# Patient Record
Sex: MALE | Race: WHITE | Employment: UNEMPLOYED | URBAN - METROPOLITAN AREA
[De-identification: names, ages, dates, MRNs, and addresses within clinical notes are randomized per-mention and may not be internally consistent; named-entity substitution may affect disease eponyms.]

---

## 2018-01-23 ENCOUNTER — HOSPITAL ENCOUNTER (INPATIENT)
Age: 73
LOS: 2 days | Discharge: NURSING, REHAB OR LONG-TERM CARE | DRG: 694 | End: 2018-01-26
Attending: EMERGENCY MEDICINE | Admitting: HOSPITALIST
Payer: MEDICARE

## 2018-01-23 ENCOUNTER — APPOINTMENT (OUTPATIENT)
Dept: GENERAL RADIOLOGY | Age: 73
DRG: 694 | End: 2018-01-23
Attending: UROLOGY
Payer: MEDICARE

## 2018-01-23 ENCOUNTER — ANESTHESIA (OUTPATIENT)
Dept: SURGERY | Age: 73
DRG: 694 | End: 2018-01-23
Payer: MEDICARE

## 2018-01-23 ENCOUNTER — ANESTHESIA EVENT (OUTPATIENT)
Dept: SURGERY | Age: 73
DRG: 694 | End: 2018-01-23
Payer: MEDICARE

## 2018-01-23 DIAGNOSIS — N13.9 OBSTRUCTIVE UROPATHY: ICD-10-CM

## 2018-01-23 DIAGNOSIS — N20.1 URETEROLITHIASIS: Primary | ICD-10-CM

## 2018-01-23 PROBLEM — I10 HTN (HYPERTENSION): Status: ACTIVE | Noted: 2018-01-23

## 2018-01-23 PROBLEM — R01.1 CARDIAC MURMUR: Status: ACTIVE | Noted: 2018-01-23

## 2018-01-23 PROBLEM — F32.A DEPRESSION: Status: ACTIVE | Noted: 2018-01-23

## 2018-01-23 PROBLEM — N20.0 NEPHROLITHIASIS: Status: ACTIVE | Noted: 2018-01-23

## 2018-01-23 PROBLEM — F02.80 ALZHEIMER'S DEMENTIA (HCC): Status: ACTIVE | Noted: 2018-01-23

## 2018-01-23 PROBLEM — Z98.890 STATUS POST CYSTOSCOPY: Status: ACTIVE | Noted: 2018-01-23

## 2018-01-23 PROBLEM — G30.9 ALZHEIMER'S DEMENTIA (HCC): Status: ACTIVE | Noted: 2018-01-23

## 2018-01-23 PROBLEM — E11.9 TYPE II DIABETES MELLITUS (HCC): Status: ACTIVE | Noted: 2018-01-23

## 2018-01-23 PROBLEM — I25.2 HISTORY OF MYOCARDIAL INFARCTION: Status: ACTIVE | Noted: 2018-01-23

## 2018-01-23 LAB
ANION GAP SERPL CALC-SCNC: 4 MMOL/L (ref 3–18)
APPEARANCE UR: ABNORMAL
BACTERIA URNS QL MICRO: ABNORMAL /HPF
BASOPHILS # BLD: 0 K/UL (ref 0–0.06)
BASOPHILS NFR BLD: 0 % (ref 0–2)
BILIRUB UR QL: NEGATIVE
BUN SERPL-MCNC: 33 MG/DL (ref 7–18)
BUN/CREAT SERPL: 20 (ref 12–20)
CALCIUM SERPL-MCNC: 8.7 MG/DL (ref 8.5–10.1)
CHLORIDE SERPL-SCNC: 103 MMOL/L (ref 100–108)
CO2 SERPL-SCNC: 34 MMOL/L (ref 21–32)
COLOR UR: ABNORMAL
CREAT SERPL-MCNC: 1.68 MG/DL (ref 0.6–1.3)
DIFFERENTIAL METHOD BLD: ABNORMAL
EOSINOPHIL # BLD: 0 K/UL (ref 0–0.4)
EOSINOPHIL NFR BLD: 1 % (ref 0–5)
EPITH CASTS URNS QL MICRO: ABNORMAL /LPF (ref 0–5)
ERYTHROCYTE [DISTWIDTH] IN BLOOD BY AUTOMATED COUNT: 16.2 % (ref 11.6–14.5)
GLUCOSE BLD STRIP.AUTO-MCNC: 164 MG/DL (ref 70–110)
GLUCOSE BLD STRIP.AUTO-MCNC: 200 MG/DL (ref 70–110)
GLUCOSE BLD STRIP.AUTO-MCNC: 92 MG/DL (ref 70–110)
GLUCOSE SERPL-MCNC: 187 MG/DL (ref 74–99)
GLUCOSE UR STRIP.AUTO-MCNC: 500 MG/DL
HCT VFR BLD AUTO: 32.5 % (ref 36–48)
HGB BLD-MCNC: 10.1 G/DL (ref 13–16)
HGB UR QL STRIP: ABNORMAL
KETONES UR QL STRIP.AUTO: ABNORMAL MG/DL
LEUKOCYTE ESTERASE UR QL STRIP.AUTO: ABNORMAL
LYMPHOCYTES # BLD: 0.8 K/UL (ref 0.9–3.6)
LYMPHOCYTES NFR BLD: 11 % (ref 21–52)
MCH RBC QN AUTO: 26.9 PG (ref 24–34)
MCHC RBC AUTO-ENTMCNC: 31.1 G/DL (ref 31–37)
MCV RBC AUTO: 86.4 FL (ref 74–97)
MONOCYTES # BLD: 0.7 K/UL (ref 0.05–1.2)
MONOCYTES NFR BLD: 10 % (ref 3–10)
NEUTS SEG # BLD: 5.6 K/UL (ref 1.8–8)
NEUTS SEG NFR BLD: 78 % (ref 40–73)
NITRITE UR QL STRIP.AUTO: NEGATIVE
PH UR STRIP: 5 [PH] (ref 5–8)
PLATELET # BLD AUTO: 191 K/UL (ref 135–420)
PMV BLD AUTO: 10.5 FL (ref 9.2–11.8)
POTASSIUM SERPL-SCNC: 5.1 MMOL/L (ref 3.5–5.5)
PROT UR STRIP-MCNC: 300 MG/DL
RBC # BLD AUTO: 3.76 M/UL (ref 4.7–5.5)
RBC #/AREA URNS HPF: ABNORMAL /HPF (ref 0–5)
SODIUM SERPL-SCNC: 141 MMOL/L (ref 136–145)
SP GR UR REFRACTOMETRY: 1.03 (ref 1–1.03)
UROBILINOGEN UR QL STRIP.AUTO: 1 EU/DL (ref 0.2–1)
WBC # BLD AUTO: 7.1 K/UL (ref 4.6–13.2)
WBC URNS QL MICRO: ABNORMAL /HPF (ref 0–4)
YEAST URNS QL MICRO: ABNORMAL

## 2018-01-23 PROCEDURE — 85025 COMPLETE CBC W/AUTO DIFF WBC: CPT | Performed by: EMERGENCY MEDICINE

## 2018-01-23 PROCEDURE — 77030012510 HC MSK AIRWY LMA TELE -B: Performed by: ANESTHESIOLOGY

## 2018-01-23 PROCEDURE — 74011636637 HC RX REV CODE- 636/637: Performed by: NURSE PRACTITIONER

## 2018-01-23 PROCEDURE — 77030033263 HC DRSG MEPILEX 16-48IN BORD MOLN -B

## 2018-01-23 PROCEDURE — 76210000000 HC OR PH I REC 2 TO 2.5 HR: Performed by: UROLOGY

## 2018-01-23 PROCEDURE — 81001 URINALYSIS AUTO W/SCOPE: CPT | Performed by: EMERGENCY MEDICINE

## 2018-01-23 PROCEDURE — 74011000250 HC RX REV CODE- 250

## 2018-01-23 PROCEDURE — C2617 STENT, NON-COR, TEM W/O DEL: HCPCS | Performed by: UROLOGY

## 2018-01-23 PROCEDURE — BT1D1ZZ FLUOROSCOPY OF RIGHT KIDNEY, URETER AND BLADDER USING LOW OSMOLAR CONTRAST: ICD-10-PCS | Performed by: UROLOGY

## 2018-01-23 PROCEDURE — 74011250637 HC RX REV CODE- 250/637: Performed by: UROLOGY

## 2018-01-23 PROCEDURE — 74011000250 HC RX REV CODE- 250: Performed by: UROLOGY

## 2018-01-23 PROCEDURE — 80048 BASIC METABOLIC PNL TOTAL CA: CPT | Performed by: EMERGENCY MEDICINE

## 2018-01-23 PROCEDURE — 0T7D8ZZ DILATION OF URETHRA, VIA NATURAL OR ARTIFICIAL OPENING ENDOSCOPIC: ICD-10-PCS | Performed by: UROLOGY

## 2018-01-23 PROCEDURE — 74011250636 HC RX REV CODE- 250/636

## 2018-01-23 PROCEDURE — 74011636637 HC RX REV CODE- 636/637

## 2018-01-23 PROCEDURE — 87077 CULTURE AEROBIC IDENTIFY: CPT | Performed by: EMERGENCY MEDICINE

## 2018-01-23 PROCEDURE — 99218 HC RM OBSERVATION: CPT

## 2018-01-23 PROCEDURE — C1758 CATHETER, URETERAL: HCPCS | Performed by: UROLOGY

## 2018-01-23 PROCEDURE — 77030032490 HC SLV COMPR SCD KNE COVD -B: Performed by: UROLOGY

## 2018-01-23 PROCEDURE — 87086 URINE CULTURE/COLONY COUNT: CPT | Performed by: UROLOGY

## 2018-01-23 PROCEDURE — 93005 ELECTROCARDIOGRAM TRACING: CPT

## 2018-01-23 PROCEDURE — 76060000032 HC ANESTHESIA 0.5 TO 1 HR: Performed by: UROLOGY

## 2018-01-23 PROCEDURE — 74420 UROGRAPHY RTRGR +-KUB: CPT

## 2018-01-23 PROCEDURE — 74011250636 HC RX REV CODE- 250/636: Performed by: UROLOGY

## 2018-01-23 PROCEDURE — 87086 URINE CULTURE/COLONY COUNT: CPT | Performed by: EMERGENCY MEDICINE

## 2018-01-23 PROCEDURE — 74011250636 HC RX REV CODE- 250/636: Performed by: NURSE PRACTITIONER

## 2018-01-23 PROCEDURE — 87186 SC STD MICRODIL/AGAR DIL: CPT | Performed by: EMERGENCY MEDICINE

## 2018-01-23 PROCEDURE — 74011636637 HC RX REV CODE- 636/637: Performed by: ANESTHESIOLOGY

## 2018-01-23 PROCEDURE — 77030018836 HC SOL IRR NACL ICUM -A: Performed by: UROLOGY

## 2018-01-23 PROCEDURE — 74011250636 HC RX REV CODE- 250/636: Performed by: NURSE ANESTHETIST, CERTIFIED REGISTERED

## 2018-01-23 PROCEDURE — 0T768DZ DILATION OF RIGHT URETER WITH INTRALUMINAL DEVICE, VIA NATURAL OR ARTIFICIAL OPENING ENDOSCOPIC: ICD-10-PCS | Performed by: UROLOGY

## 2018-01-23 PROCEDURE — 77030034696 HC CATH URETH FOL 2W BARD -A: Performed by: UROLOGY

## 2018-01-23 PROCEDURE — 99284 EMERGENCY DEPT VISIT MOD MDM: CPT

## 2018-01-23 PROCEDURE — C1769 GUIDE WIRE: HCPCS | Performed by: UROLOGY

## 2018-01-23 PROCEDURE — 74011250637 HC RX REV CODE- 250/637: Performed by: NURSE PRACTITIONER

## 2018-01-23 PROCEDURE — 74011636320 HC RX REV CODE- 636/320: Performed by: UROLOGY

## 2018-01-23 PROCEDURE — 83036 HEMOGLOBIN GLYCOSYLATED A1C: CPT | Performed by: NURSE PRACTITIONER

## 2018-01-23 PROCEDURE — 82962 GLUCOSE BLOOD TEST: CPT

## 2018-01-23 PROCEDURE — 74011250637 HC RX REV CODE- 250/637: Performed by: ANESTHESIOLOGY

## 2018-01-23 PROCEDURE — 76010000138 HC OR TIME 0.5 TO 1 HR: Performed by: UROLOGY

## 2018-01-23 DEVICE — STENT URET 7FR L26CM POLYMER BLEND PH FREE COAT GRADUAL: Type: IMPLANTABLE DEVICE | Site: URETER | Status: FUNCTIONAL

## 2018-01-23 RX ORDER — MEMANTINE HYDROCHLORIDE 10 MG/1
TABLET ORAL 2 TIMES DAILY
COMMUNITY

## 2018-01-23 RX ORDER — DEXTROSE 50 % IN WATER (D50W) INTRAVENOUS SYRINGE
25-50 AS NEEDED
Status: DISCONTINUED | OUTPATIENT
Start: 2018-01-23 | End: 2018-01-23 | Stop reason: HOSPADM

## 2018-01-23 RX ORDER — DOCUSATE SODIUM 100 MG/1
100 CAPSULE, LIQUID FILLED ORAL 2 TIMES DAILY
Status: DISCONTINUED | OUTPATIENT
Start: 2018-01-23 | End: 2018-01-26 | Stop reason: HOSPADM

## 2018-01-23 RX ORDER — DONEPEZIL HYDROCHLORIDE 5 MG/1
10 TABLET, FILM COATED ORAL
Status: DISCONTINUED | OUTPATIENT
Start: 2018-01-23 | End: 2018-01-26 | Stop reason: HOSPADM

## 2018-01-23 RX ORDER — OXYBUTYNIN CHLORIDE 5 MG/1
5 TABLET ORAL 4 TIMES DAILY
Status: DISCONTINUED | OUTPATIENT
Start: 2018-01-23 | End: 2018-01-26 | Stop reason: HOSPADM

## 2018-01-23 RX ORDER — LIDOCAINE HYDROCHLORIDE 20 MG/ML
JELLY TOPICAL
Status: COMPLETED
Start: 2018-01-23 | End: 2018-01-23

## 2018-01-23 RX ORDER — DIPHENHYDRAMINE HCL 25 MG
25 CAPSULE ORAL
Status: DISCONTINUED | OUTPATIENT
Start: 2018-01-23 | End: 2018-01-26 | Stop reason: HOSPADM

## 2018-01-23 RX ORDER — DEXTROSE 50 % IN WATER (D50W) INTRAVENOUS SYRINGE
25-50 AS NEEDED
Status: DISCONTINUED | OUTPATIENT
Start: 2018-01-23 | End: 2018-01-26 | Stop reason: HOSPADM

## 2018-01-23 RX ORDER — MAGNESIUM SULFATE 100 %
4 CRYSTALS MISCELLANEOUS AS NEEDED
Status: DISCONTINUED | OUTPATIENT
Start: 2018-01-23 | End: 2018-01-26 | Stop reason: HOSPADM

## 2018-01-23 RX ORDER — INSULIN LISPRO 100 [IU]/ML
INJECTION, SOLUTION INTRAVENOUS; SUBCUTANEOUS ONCE
Status: COMPLETED | OUTPATIENT
Start: 2018-01-23 | End: 2018-01-23

## 2018-01-23 RX ORDER — FENTANYL CITRATE 50 UG/ML
25 INJECTION, SOLUTION INTRAMUSCULAR; INTRAVENOUS AS NEEDED
Status: DISCONTINUED | OUTPATIENT
Start: 2018-01-23 | End: 2018-01-23 | Stop reason: HOSPADM

## 2018-01-23 RX ORDER — LIDOCAINE HYDROCHLORIDE 20 MG/ML
JELLY TOPICAL ONCE
Status: DISCONTINUED | OUTPATIENT
Start: 2018-01-23 | End: 2018-01-23 | Stop reason: HOSPADM

## 2018-01-23 RX ORDER — SERTRALINE HYDROCHLORIDE 50 MG/1
50 TABLET, FILM COATED ORAL DAILY
Status: DISCONTINUED | OUTPATIENT
Start: 2018-01-24 | End: 2018-01-26 | Stop reason: HOSPADM

## 2018-01-23 RX ORDER — LIDOCAINE HYDROCHLORIDE 20 MG/ML
INJECTION, SOLUTION EPIDURAL; INFILTRATION; INTRACAUDAL; PERINEURAL AS NEEDED
Status: DISCONTINUED | OUTPATIENT
Start: 2018-01-23 | End: 2018-01-23 | Stop reason: HOSPADM

## 2018-01-23 RX ORDER — INSULIN LISPRO 100 [IU]/ML
INJECTION, SOLUTION INTRAVENOUS; SUBCUTANEOUS
Status: DISCONTINUED | OUTPATIENT
Start: 2018-01-23 | End: 2018-01-26 | Stop reason: HOSPADM

## 2018-01-23 RX ORDER — MAGNESIUM SULFATE 100 %
4 CRYSTALS MISCELLANEOUS AS NEEDED
Status: DISCONTINUED | OUTPATIENT
Start: 2018-01-23 | End: 2018-01-23 | Stop reason: HOSPADM

## 2018-01-23 RX ORDER — FENTANYL CITRATE 50 UG/ML
INJECTION, SOLUTION INTRAMUSCULAR; INTRAVENOUS AS NEEDED
Status: DISCONTINUED | OUTPATIENT
Start: 2018-01-23 | End: 2018-01-23 | Stop reason: HOSPADM

## 2018-01-23 RX ORDER — DONEPEZIL HYDROCHLORIDE 10 MG/1
10 TABLET, FILM COATED ORAL
COMMUNITY

## 2018-01-23 RX ORDER — ONDANSETRON 2 MG/ML
INJECTION INTRAMUSCULAR; INTRAVENOUS AS NEEDED
Status: DISCONTINUED | OUTPATIENT
Start: 2018-01-23 | End: 2018-01-23 | Stop reason: HOSPADM

## 2018-01-23 RX ORDER — SERTRALINE HYDROCHLORIDE 50 MG/1
75 TABLET, FILM COATED ORAL DAILY
COMMUNITY

## 2018-01-23 RX ORDER — HYDROCODONE BITARTRATE AND ACETAMINOPHEN 5; 325 MG/1; MG/1
1 TABLET ORAL
Status: DISCONTINUED | OUTPATIENT
Start: 2018-01-23 | End: 2018-01-26 | Stop reason: HOSPADM

## 2018-01-23 RX ORDER — ACETAMINOPHEN 325 MG/1
650 TABLET ORAL
Status: DISCONTINUED | OUTPATIENT
Start: 2018-01-23 | End: 2018-01-26 | Stop reason: HOSPADM

## 2018-01-23 RX ORDER — FUROSEMIDE 40 MG/1
TABLET ORAL DAILY
COMMUNITY

## 2018-01-23 RX ORDER — SODIUM CHLORIDE 0.9 % (FLUSH) 0.9 %
5-10 SYRINGE (ML) INJECTION AS NEEDED
Status: DISCONTINUED | OUTPATIENT
Start: 2018-01-23 | End: 2018-01-23 | Stop reason: HOSPADM

## 2018-01-23 RX ORDER — ONDANSETRON 2 MG/ML
4 INJECTION INTRAMUSCULAR; INTRAVENOUS ONCE
Status: DISCONTINUED | OUTPATIENT
Start: 2018-01-23 | End: 2018-01-23 | Stop reason: HOSPADM

## 2018-01-23 RX ORDER — INSULIN LISPRO 100 [IU]/ML
INJECTION, SOLUTION INTRAVENOUS; SUBCUTANEOUS
Status: COMPLETED
Start: 2018-01-23 | End: 2018-01-23

## 2018-01-23 RX ORDER — SODIUM CHLORIDE 0.9 % (FLUSH) 0.9 %
5-10 SYRINGE (ML) INJECTION EVERY 8 HOURS
Status: DISCONTINUED | OUTPATIENT
Start: 2018-01-23 | End: 2018-01-23 | Stop reason: HOSPADM

## 2018-01-23 RX ORDER — ONDANSETRON 2 MG/ML
4 INJECTION INTRAMUSCULAR; INTRAVENOUS
Status: DISCONTINUED | OUTPATIENT
Start: 2018-01-23 | End: 2018-01-26 | Stop reason: HOSPADM

## 2018-01-23 RX ORDER — INSULIN GLARGINE 100 [IU]/ML
35 INJECTION, SOLUTION SUBCUTANEOUS
COMMUNITY

## 2018-01-23 RX ORDER — LIDOCAINE HYDROCHLORIDE 10 MG/ML
0.1 INJECTION, SOLUTION EPIDURAL; INFILTRATION; INTRACAUDAL; PERINEURAL AS NEEDED
Status: DISCONTINUED | OUTPATIENT
Start: 2018-01-23 | End: 2018-01-23 | Stop reason: HOSPADM

## 2018-01-23 RX ORDER — ATROPA BELLADONNA AND OPIUM 16.2; 3 MG/1; MG/1
1 SUPPOSITORY RECTAL
Status: DISCONTINUED | OUTPATIENT
Start: 2018-01-23 | End: 2018-01-26 | Stop reason: HOSPADM

## 2018-01-23 RX ORDER — PROPOFOL 10 MG/ML
INJECTION, EMULSION INTRAVENOUS AS NEEDED
Status: DISCONTINUED | OUTPATIENT
Start: 2018-01-23 | End: 2018-01-23 | Stop reason: HOSPADM

## 2018-01-23 RX ORDER — EPHEDRINE SULFATE 50 MG/ML
INJECTION, SOLUTION INTRAVENOUS AS NEEDED
Status: DISCONTINUED | OUTPATIENT
Start: 2018-01-23 | End: 2018-01-23 | Stop reason: HOSPADM

## 2018-01-23 RX ORDER — FENTANYL CITRATE 50 UG/ML
50 INJECTION, SOLUTION INTRAMUSCULAR; INTRAVENOUS
Status: COMPLETED | OUTPATIENT
Start: 2018-01-23 | End: 2018-01-23

## 2018-01-23 RX ORDER — SODIUM CHLORIDE 9 MG/ML
25 INJECTION, SOLUTION INTRAVENOUS CONTINUOUS
Status: DISCONTINUED | OUTPATIENT
Start: 2018-01-23 | End: 2018-01-26 | Stop reason: HOSPADM

## 2018-01-23 RX ORDER — HYDROMORPHONE HYDROCHLORIDE 1 MG/ML
0.2 INJECTION, SOLUTION INTRAMUSCULAR; INTRAVENOUS; SUBCUTANEOUS
Status: DISCONTINUED | OUTPATIENT
Start: 2018-01-23 | End: 2018-01-26 | Stop reason: HOSPADM

## 2018-01-23 RX ORDER — MEMANTINE HYDROCHLORIDE 10 MG/1
10 TABLET ORAL 2 TIMES DAILY
Status: DISCONTINUED | OUTPATIENT
Start: 2018-01-23 | End: 2018-01-26 | Stop reason: HOSPADM

## 2018-01-23 RX ORDER — FAMOTIDINE 20 MG/1
20 TABLET, FILM COATED ORAL ONCE
Status: COMPLETED | OUTPATIENT
Start: 2018-01-23 | End: 2018-01-23

## 2018-01-23 RX ORDER — SODIUM CHLORIDE, SODIUM LACTATE, POTASSIUM CHLORIDE, CALCIUM CHLORIDE 600; 310; 30; 20 MG/100ML; MG/100ML; MG/100ML; MG/100ML
50 INJECTION, SOLUTION INTRAVENOUS CONTINUOUS
Status: DISCONTINUED | OUTPATIENT
Start: 2018-01-23 | End: 2018-01-23 | Stop reason: HOSPADM

## 2018-01-23 RX ADMIN — FENTANYL CITRATE 50 MCG: 50 INJECTION, SOLUTION INTRAMUSCULAR; INTRAVENOUS at 16:45

## 2018-01-23 RX ADMIN — SODIUM CHLORIDE 25 ML/HR: 900 INJECTION, SOLUTION INTRAVENOUS at 15:14

## 2018-01-23 RX ADMIN — INSULIN LISPRO 3 UNITS: 100 INJECTION, SOLUTION INTRAVENOUS; SUBCUTANEOUS at 17:15

## 2018-01-23 RX ADMIN — FENTANYL CITRATE 25 MCG: 50 INJECTION, SOLUTION INTRAMUSCULAR; INTRAVENOUS at 16:16

## 2018-01-23 RX ADMIN — DONEPEZIL HYDROCHLORIDE 10 MG: 5 TABLET, FILM COATED ORAL at 22:11

## 2018-01-23 RX ADMIN — PROPOFOL 120 MG: 10 INJECTION, EMULSION INTRAVENOUS at 15:54

## 2018-01-23 RX ADMIN — INSULIN LISPRO 6 UNITS: 100 INJECTION, SOLUTION INTRAVENOUS; SUBCUTANEOUS at 15:31

## 2018-01-23 RX ADMIN — HYDROCODONE BITARTRATE AND ACETAMINOPHEN 1 TABLET: 5; 325 TABLET ORAL at 18:00

## 2018-01-23 RX ADMIN — SODIUM CHLORIDE, SODIUM LACTATE, POTASSIUM CHLORIDE, AND CALCIUM CHLORIDE 50 ML/HR: 600; 310; 30; 20 INJECTION, SOLUTION INTRAVENOUS at 17:45

## 2018-01-23 RX ADMIN — HYDROCODONE BITARTRATE AND ACETAMINOPHEN 1 TABLET: 5; 325 TABLET ORAL at 22:10

## 2018-01-23 RX ADMIN — FAMOTIDINE 20 MG: 20 TABLET, FILM COATED ORAL at 15:34

## 2018-01-23 RX ADMIN — OXYBUTYNIN CHLORIDE 5 MG: 5 TABLET ORAL at 22:11

## 2018-01-23 RX ADMIN — INSULIN DETEMIR 25 UNITS: 100 INJECTION, SOLUTION SUBCUTANEOUS at 22:04

## 2018-01-23 RX ADMIN — EPHEDRINE SULFATE 10 MG: 50 INJECTION, SOLUTION INTRAVENOUS at 16:21

## 2018-01-23 RX ADMIN — FENTANYL CITRATE 50 MCG: 50 INJECTION, SOLUTION INTRAMUSCULAR; INTRAVENOUS at 17:00

## 2018-01-23 RX ADMIN — LIDOCAINE HYDROCHLORIDE 5 ML: 20 JELLY TOPICAL at 18:38

## 2018-01-23 RX ADMIN — FENTANYL CITRATE 50 MCG: 50 INJECTION, SOLUTION INTRAMUSCULAR; INTRAVENOUS at 15:58

## 2018-01-23 RX ADMIN — FENTANYL CITRATE 25 MCG: 50 INJECTION, SOLUTION INTRAMUSCULAR; INTRAVENOUS at 16:02

## 2018-01-23 RX ADMIN — LIDOCAINE HYDROCHLORIDE 60 MG: 20 INJECTION, SOLUTION EPIDURAL; INFILTRATION; INTRACAUDAL; PERINEURAL at 15:54

## 2018-01-23 RX ADMIN — HYDROMORPHONE HYDROCHLORIDE 0.2 MG: 1 INJECTION, SOLUTION INTRAMUSCULAR; INTRAVENOUS; SUBCUTANEOUS at 21:10

## 2018-01-23 RX ADMIN — OXYBUTYNIN CHLORIDE 5 MG: 5 TABLET ORAL at 18:39

## 2018-01-23 RX ADMIN — CEFTRIAXONE 1 G: 1 INJECTION, POWDER, FOR SOLUTION INTRAMUSCULAR; INTRAVENOUS at 16:00

## 2018-01-23 RX ADMIN — ONDANSETRON 4 MG: 2 INJECTION INTRAMUSCULAR; INTRAVENOUS at 16:06

## 2018-01-23 NOTE — ED PROVIDER NOTES
EMERGENCY DEPARTMENT HISTORY AND PHYSICAL EXAM    1:57 PM      Date: 1/23/2018  Patient Name: Tip Hung    History of Presenting Illness     Chief Complaint   Patient presents with    Flank Pain         History Provided By: Patient, medical records    Chief Complaint: Kidney stone    As the patient is without physical symptoms or complaints of pain, there is no severity of pain, quality of pain, duration, modifying factors, or associated signs and symptoms regarding the pt's presenting complaint. Additional History (Context): Tip Hung is a 71 y.o. male with hx DM who presents per EMS from [facility] for transfer. Pt was found to have a kidney stone requiring surgery and was transferred here. Upon ED arrival, pt is in no pain and asymptomatic. PCP: No primary care provider on file. Past History     Past Medical History:  Past Medical History:   Diagnosis Date    Diabetes ([address]     HTN (hypertension)        Past Surgical History:  No past surgical history on file. Family History:  No family history on file. Social History:  Social History   Substance Use Topics    Smoking status: Not on file    Smokeless tobacco: Not on file    Alcohol use Not on file       Allergies: Allergies not on file      Review of Systems     Review of Systems   Constitutional: Negative for diaphoresis and fever. HENT: Negative for congestion and sore throat. Eyes: Negative for pain and itching. Respiratory: Negative for cough and shortness of breath. Cardiovascular: Negative for chest pain and palpitations. Gastrointestinal: Negative for abdominal pain and diarrhea. Endocrine: Negative for polydipsia and polyuria. Genitourinary: Negative for dysuria and hematuria. Musculoskeletal: Negative for arthralgias and myalgias. Skin: Negative for rash and wound. Neurological: Negative for seizures and syncope. Hematological: Does not bruise/bleed easily.    Psychiatric/Behavioral: Negative for agitation and hallucinations. Physical Exam     Visit Vitals    /72    Pulse 92    Temp 97.5 °F (36.4 °C)    Resp 18    SpO2 96%       Physical Exam   Constitutional: He appears well-developed and well-nourished. No distress. HENT:   Head: Normocephalic and atraumatic. Eyes: Conjunctivae are normal. No scleral icterus. Neck: Normal range of motion. Neck supple. No JVD present. Cardiovascular: Normal rate, regular rhythm and normal heart sounds. 4 intact extremity pulses   Pulmonary/Chest: Effort normal and breath sounds normal.   Abdominal: Soft. He exhibits no mass. There is no tenderness. Musculoskeletal: Normal range of motion. Lymphadenopathy:     He has no cervical adenopathy. Neurological: He is alert. Skin: Skin is warm and dry. Nursing note and vitals reviewed. Diagnostic Study Results     Labs -  No results found for this or any previous visit (from the past 12 hour(s)). Radiologic Studies -   No orders to display     No results found. Medical Decision Making   Initial Medical Decision Making and DDx:    Creatinine 1.67, no acute distress on arrival to [facility]. No temp or elevated white count. Pt here with obstructing 7 mm right ureteral stone, plan for stenting. Clinical picture not c.w sepsis, diverticulitis, bowel obstruc or perf. ED Course: Progress Notes, Reevaluation, and Consults:    14:06 Consult:  Discussed care with Elizabeth Carson (Hospitalist). Standard discussion; including history of patients chief complaint, available diagnostic results, and treatment course. Will see and evaluate pt. Discussed obstructing kidney stone, plan for stent. Cardiac history cannot find prior echo in [facility]s record. 14:06 Consult:  Discussed care with Dr. Yolanda Brady  (Urology). Standard discussion; including history of patients chief complaint, available diagnostic results, and treatment course.  Discussed nml WBC, benign physical, pt is pleasant and cooperative. Dr. Fanny Shane will follow. I am the first provider for this patient. I reviewed the vital signs, available nursing notes, past medical history, past surgical history, family history and social history. Vital Signs-Reviewed the patient's vital signs. Pulse Oximetry Analysis - 96% on room air, non-hypoxic    Cardiac Monitor:  Rate/Rhythm:  92    Records Reviewed: Nursing Notes and Previous Radiology Studies (Time of Review: 1:53 PM)        For Hospitalized Patients:    2. Aspirin: Aspirin was not given because the patient did not present with a stroke at the time of their Emergency Department evaluation    Diagnosis     Clinical Impression:   1. Ureterolithiasis    2. Obstructive uropathy        Disposition: Admit      Patient's Medications    No medications on file     _______________________________    Attestations:  Scribe Attestation     Dixon Guzman acting as a scribe for and in the presence of Cyn Dunlap MD      January 23, 2018 at [facility] PM       Provider Attestation:      I personally performed the services described in the documentation, reviewed the documentation, as recorded by the scribe in my presence, and it accurately and completely records my words and actions.  January 23, 2018 at 2:18 PM - Cyn Dunlap MD    _______________________________

## 2018-01-23 NOTE — IP AVS SNAPSHOT
[address] [facility], [place], operated by [facility] 
 
 
 [address] Patient: Rajendra Jefferson MRN: [id number] About your hospitalization You were admitted on:  January 23, 2018 You last received care in the:  [address] [place] [address],[address] You were discharged on:  January 26, 2018 Why you were hospitalized Your primary diagnosis was:  Nephrolithiasis Your diagnoses also included:  Htn (Hypertension), Type Ii Diabetes Mellitus (Hcc), Cardiac Murmur, History Of Myocardial Infarction, Alzheimer's Dementia, Depression, Status Post Cystoscopy Follow-up Information Follow up With Details Comments Contact Info Felix [place]   [address] [address] [id number] [phone] [place] Philip MD In 1 week call for appt. [address] [place] [address] [address] [place] [address] [id number] [phone] Discharge Orders None A check liana indicates which time of day the medication should be taken. My Medications START taking these medications Instructions Each Dose to Equal  
 Morning Noon Evening Bedtime  
 oxybutynin 5 mg tablet Commonly known as:  BCIRPSLW Your last dose was: Your next dose is: Take 1 Tab by mouth four (4) times daily. Indications: post op 5 mg CONTINUE taking these medications Instructions Each Dose to Equal  
 Morning Noon Evening Bedtime  
 donepezil 10 mg tablet Commonly known as:  ARICEPT Your last dose was: Your next dose is: Take 10 mg by mouth nightly. 10 mg  
    
   
   
   
  
 LANTUS 100 unit/mL injection Generic drug:  insulin glargine Your last dose was: Your next dose is:    
   
   
 50 Units by SubCUTAneous route nightly. 50 Units LASIX 40 mg tablet Generic drug:  furosemide Your last dose was: Your next dose is: Take  by mouth daily. memantine 10 mg tablet Commonly known as:  Adilene Navarrete Your last dose was: Your next dose is: Take  by mouth two (2) times a day. ZOLOFT 50 mg tablet Generic drug:  sertraline Your last dose was: Your next dose is: Take  by mouth daily. Where to Get Your Medications Information on where to get these meds will be given to you by the nurse or doctor. ! Ask your nurse or doctor about these medications  
  oxybutynin 5 mg tablet Discharge Instructions DISCHARGE SUMMARY from Nurse PATIENT INSTRUCTIONS: 
 
 
F-face looks uneven A-arms unable to move or move unevenly S-speech slurred or non-existent T-time-call 911 as soon as signs and symptoms begin-DO NOT go Back to bed or wait to see if you get better-TIME IS BRAIN. Warning Signs of HEART ATTACK Call 911 if you have these symptoms: 
? Chest discomfort. Most heart attacks involve discomfort in the center of the chest that lasts more than a few minutes, or that goes away and comes back. It can feel like uncomfortable pressure, squeezing, fullness, or pain. ? Discomfort in other areas of the upper body. Symptoms can include pain or discomfort in one or both arms, the back, neck, jaw, or stomach. ? Shortness of breath with or without chest discomfort. ? Other signs may include breaking out in a cold sweat, nausea, or lightheadedness. Don't wait more than five minutes to call [address]! Fast action can save your life.  Calling 911 is almost always the fastest way to get lifesaving treatment. Emergency Medical Services staff can begin treatment when they arrive  up to an hour sooner than if someone gets to the hospital by car. The discharge information has been reviewed with the patient. The patient verbalized understanding. Discharge medications reviewed with the patient and appropriate educational materials and side effects teaching were provided. Patient armband removed and shredded. ___________________________________________________________________________________________________________________________________ Introducing [facility] & HEALTH SERVICES! [employer] introduces enGene patient portal. Now you can access parts of your medical record, email your doctor's office, and request medication refills online. 1. In your internet browser, go to [email or web address] 2. Click on the First Time User? Click Here link in the Sign In box. You will see the New Member Sign Up page. 3. Enter your enGene Access Code exactly as it appears below. You will not need to use this code after youve completed the sign-up process. If you do not sign up before the expiration date, you must request a new code. · enGene Access Code: [email or web address] Expires: 4/26/2018 11:48 AM 
 
4. Enter the last four digits of your Social Security Number (xxxx) and Date of Birth (mm/dd/yyyy) as indicated and click Submit. You will be taken to the next sign-up page. 5. Create a Greener Expressionst ID. This will be your Greener Expressionst login ID and cannot be changed, so think of one that is secure and easy to remember. 6. Create a Greener Expressionst password. You can change your password at any time. 7. Enter your Password Reset Question and Answer. This can be used at a later time if you forget your password. 8. Enter your e-mail address. You will receive e-mail notification when new information is available in [address] 9. Click Sign Up.  You can now view and download portions of your medical record. 10. Click the Download Summary menu link to download a portable copy of your medical information. If you have questions, please visit the Frequently Asked Questions section of the [employer] website. Remember, [employer] is NOT to be used for urgent needs. For medical emergencies, dial 911. Now available from your iPhone and Android! Unresulted Labs-Please follow up with your PCP about these lab tests Order Current Status XR RETROGRADE PYELOGRAM In process Providers Seen During Your Hospitalization Provider Specialty Primary office phone Naren Titus MD Emergency Medicine [phone] Ellyn Posey MD Urology [phone] Yoni Garland MD Internal Medicine [phone] Herlinda Sesay MD Internal Medicine [phone] Immunizations Administered for This Admission Name Date Influenza Vaccine (Quad) PF 1/24/2018 Your Primary Care Physician (PCP) Primary Care Physician Office Phone Office Fax [phone] [address] [phone] You are allergic to the following No active allergies Recent Documentation Smoking Status Former Smoker Emergency Contacts Name Discharge Info Relation Home Work Mobile Chantal Salmeron DISCHARGE CAREGIVER [3] Daughter [21]   [phone] Patient Belongings The following personal items are in your possession at time of discharge: 
  Dental Appliances: None  Visual Aid: None Discharge Instructions Attachments/References URETERAL STENT PLACEMENT: POST-OP (ENGLISH) LITHOTRIPSY: PRE-OP (ENGLISH) OXYBUTYNIN (BY MOUTH) (ENGLISH) Patient Handouts Ureteral Stent Placement: What to Expect at Home Your Recovery A ureteral (say \"you-REE-ter-ul\") stent is a thin, hollow tube that is placed in the ureter to help urine pass from the kidney into the bladder. Ureters are the tubes that connect the kidneys to the bladder. You may have a small amount of blood in your urine for 1 to 3 days after the procedure. While the stent is in place, you may have to urinate more often, feel a sudden need to urinate, or feel like you cannot completely empty your bladder. You may feel some pain when you urinate or do strenuous activity. You also may notice a small amount of blood in your urine after strenuous activities. These side effects usually do not prevent people from doing their normal daily activities. You may have a thin string coming out of your urethra. Your urethra is the tube that carries urine from your bladder to outside your body. This string is attached to the stent. Try not to pull on the string. The doctor will use the string to pull out the stent when you no longer need it. After the procedure, urine may flow better from your kidneys to your bladder. A ureteral stent may be left in place for several days or for as long as several months. Your doctor will take it out when you no longer need it. This care sheet gives you a general idea about how long it will take for you to recover. But each person recovers at a different pace. Follow the steps below to get better as quickly as possible. How can you care for yourself at home? Activity ? · Rest when you feel tired. Getting enough sleep will help you recover. ? · Avoid strenuous activities, such as bicycle riding, jogging, weight lifting, or aerobic exercise, until your doctor says it is okay. ? · Ask your doctor when you can drive again. ? · Most people are able to return to work the day after the procedure. If your work requires intense activity, you may feel pain in your kidney area or get tired easily. If this happens, you may need to do less strenuous activities while the stent is in. ? · Ask your doctor when it is okay for you to have sex. Diet ? · You can eat your normal diet. If your stomach is upset, try bland, low-fat foods like plain rice, broiled chicken, toast, and yogurt. ? · Drink plenty of fluids (unless your doctor tells you not to). Medicines ? · Your doctor will tell you if and when you can restart your medicines. He or she will also give you instructions about taking any new medicines. ? · If you take blood thinners, such as warfarin (Coumadin), clopidogrel (Plavix), or aspirin, be sure to talk to your doctor. He or she will tell you if and when to start taking those medicines again. Make sure that you understand exactly what your doctor wants you to do. ? · Be safe with medicines. Take pain medicines exactly as directed. ¨ If the doctor gave you a prescription medicine for pain, take it as prescribed. ¨ If you are not taking a prescription pain medicine, ask your doctor if you can take an over-the-counter medicine. ? · If you think your pain medicine is making you sick to your stomach: 
¨ Take your medicine after meals (unless your doctor has told you not to). ¨ Ask your doctor for a different pain medicine. ? · If your doctor prescribed antibiotics, take them as directed. Do not stop taking them just because you feel better. You need to take the full course of antibiotics. Follow-up care is a key part of your treatment and safety. Be sure to make and go to all appointments, and call your doctor if you are having problems. It's also a good idea to know your test results and keep a list of the medicines you take. When should you call for help? Call 911 anytime you think you may need emergency care. For example, call if: 
? · You passed out (lost consciousness). ? · You have severe trouble breathing. ? · You have sudden chest pain and shortness of breath, or you cough up blood. ? · You have severe belly pain. ?Call your doctor now or seek immediate medical care if: ? · Part or all of the stent comes out of your urethra. ? · You have pain that does not get better after you take pain medicine. ? · You have symptoms of a urinary infection. For example: ¨ You have blood or pus in your urine. ¨ You have pain in your back just below your rib cage. This is called flank pain. ¨ You have a fever, chills, or body aches. ¨ It hurts to urinate. ¨ You have groin or belly pain. ? · You cannot control when you urinate, or you leak urine. ? Watch closely for changes in your health, and be sure to contact your doctor if you have any problems. Where can you learn more? Go to http://hardy-guillermina.info/. Enter N127 in the search box to learn more about \"Ureteral Stent Placement: What to Expect at Home. \" Current as of: May 12, 2017 Content Version: 11.4 © 0364-5867 [employer]. Care instructions adapted under license by [employer] (which disclaims liability or warranty for this information). If you have questions about a medical condition or this instruction, always ask your healthcare professional. [place] [address] any warranty or liability for your use of this information. Lithotripsy: Before Your Procedure What is lithotripsy? Lithotripsy is a way to treat kidney stones without surgery. It is also called extracorporeal shock wave lithotripsy, or ESWL. This treatment uses sound waves to break kidney stones into tiny pieces. These pieces can then pass out of the body in the urine. At the clinic or doctor's office, you may get medicine to make you relaxed and help with pain or discomfort. The doctor will use an X-ray to find the stone. You will lie on a table, in most cases on top of a water-filled cushion. The lithotripsy machine directs sound waves at your stone through this cushion.  After the machine starts, you may hear a popping sound and feel a thump at your side. Do not move until you are told to do so. If you have any pain, tell the doctor. The doctor may use a small, flexible tube called a stent. The doctor puts the stent inside the ureter. This is one of the tubes that takes urine from your kidneys to your bladder. The stent will let the stone pass more easily. Your doctor may remove the stent in a few weeks. Most people are at the [address] [place] [address] office or clinic for about 2 hours. You can go back to your normal routine right away. Most stones pass within 24 hours after the procedure. But it can take as long as several weeks. If you have a large stone, you may need to come back for several treatments. In some cases lithotripsy does not break up the stones. Surgery may be needed to remove them. Follow-up care is a key part of your treatment and safety. Be sure to make and go to all appointments, and call your doctor if you are having problems. It's also a good idea to know your test results and keep a list of the medicines you take. What happens before the procedure? ?Preparing for the procedure ? · Understand exactly what procedure is planned, along with the risks, benefits, and other options. · Tell your doctors ALL the medicines, vitamins, supplements, and herbal remedies you take. Some of these can increase the risk of bleeding or interact with anesthesia. ? · If you take blood thinners, such as warfarin (Coumadin), clopidogrel (Plavix), or aspirin, be sure to talk to your doctor. He or she will tell you if you should stop taking these medicines before your procedure. Make sure that you understand exactly what your doctor wants you to do.  
? · Your doctor will tell you which medicines to take or stop before your procedure. You may need to stop taking certain medicines a week or more before the procedure. So talk to your doctor as soon as you can.  
? · If you have an advance directive, let your doctor know.  It may include a living will and a durable power of  for health care. Bring a copy to the hospital. If you don't have one, you may want to prepare one. It lets your doctor and loved ones know your health care wishes. Doctors advise that everyone prepare these papers before any type of surgery or procedure. Procedures can be stressful. This information will help you understand what you can expect. And it will help you safely prepare for your procedure. What happens on the day of the procedure? · Follow the instructions exactly about when to stop eating and drinking. If you don't, your procedure may be canceled. If your doctor told you to take your medicines on the day of the procedure, take them with only a sip of water. ? · Take a bath or shower before you come in for your procedure. Do not apply lotions, perfumes, deodorants, or nail polish. ? · Take off all jewelry and piercings. And take out contact lenses, if you wear them. ? At the hospital or surgery center · Bring a picture ID. ? · You will be kept comfortable and safe by your anesthesia provider. You may get medicine that relaxes you or puts you in a light sleep. The area being worked on will be numb. ? · The procedure will take about 1 hour. Going home · Be sure you have someone to drive you home. Anesthesia and pain medicine make it unsafe for you to drive. ? · You will be given more specific instructions about recovering from your procedure. They will cover things like diet, wound care, follow-up care, driving, and getting back to your normal routine. When should you call your doctor? · You have questions or concerns. ? · You don't understand how to prepare for your procedure. ? · You become ill before the procedure (such as fever, flu, or a cold). ? · You need to reschedule or have changed your mind about having the procedure. Where can you learn more? Go to http://hardy-guillermina.info/. Enter N872 in the search box to learn more about \"Lithotripsy: Before Your Procedure. \" Current as of: May 12, 2017 Content Version: 11.4 © 8110-2867 [employer]. Care instructions adapted under license by [employer] (which disclaims liability or warranty for this information). If you have questions about a medical condition or this instruction, always ask your healthcare professional. [address] any warranty or liability for your use of this information. Oxybutynin (By mouth) Oxybutynin (ps-w-JUX-ti-prosper) Treats overactive bladder. Brand Name(s): Ditropan XL There may be other brand names for this medicine. When This Medicine Should Not Be Used: This medicine is not right for everyone. Do not use it if you had an allergic reaction to oxybutynin. How to Use This Medicine:  
Liquid, Tablet, Long Acting Tablet · Take your medicine as directed. Your dose may need to be changed several times to find what works best for you. · Oral liquid: Measure the oral liquid medicine with a marked measuring spoon, oral syringe, or medicine cup. · Swallow the extended-release tablet whole. Do not crush, break, or chew it. Take this medicine at the same time each day. · If you take the extended-release tablet, part of the tablet may pass into your stools. This is normal and is nothing to worry about. · Missed dose: Take a dose as soon as you remember. If it is almost time for your next dose, wait until then and take a regular dose. Do not take extra medicine to make up for a missed dose. · Store the medicine in a closed container at room temperature, away from heat, moisture, and direct light. Do not freeze the oral liquid. Drugs and Foods to Avoid: Ask your doctor or pharmacist before using any other medicine, including over-the-counter medicines, vitamins, and herbal products. · Some foods and medicines can affect how oxybutynin works.  Tell your doctor if you are using any of the following: ¨ Metoclopramide ¨ Bisphosphonate medicine ¨ Medicine to treat an infection (including clarithromycin, erythromycin, itraconazole, ketoconazole, miconazole) · Tell your doctor if you use anything else that makes you sleepy. Some examples are allergy medicine, narcotic pain medicine, and alcohol. Warnings While Using This Medicine: · Tell your doctor if you are pregnant or breastfeeding, or if you have kidney disease, dementia, glaucoma, heart disease, heart rhythm problems, high blood pressure, myasthenia gravis, Parkinson disease, an enlarged prostate or trouble urinating, or stomach or bowel problems (including colitis, chronic constipation, a bowel blockage, GERD). · This medicine may make you dizzy or drowsy, or cause vision problems. Do not drive or do anything else that could be dangerous until you know how this medicine affects you. · This medicine may make you sweat less. This can cause your body to become too hot. Take precautions if you exercise strenuously or are outside in hot weather. Drink plenty of fluids to stay hydrated. · Your doctor will check your progress and the effects of this medicine at regular visits. Keep all appointments. · Keep all medicine out of the reach of children. Never share your medicine with anyone. Possible Side Effects While Using This Medicine:  
Call your doctor right away if you notice any of these side effects: · Allergic reaction: Itching or hives, swelling in your face or hands, swelling or tingling in your mouth or throat, chest tightness, trouble breathing · Agitation, confusion, unusual behavior or drowsiness, seeing, hearing, or feeling things that are not there · Change in how much or how often you urinate, difficult or painful urination · Hot, dry skin, lack of sweating, weakness If you notice these less serious side effects, talk with your doctor: · Constipation, diarrhea, nausea, heartburn, stomach pain or upset · Dry mouth If you notice other side effects that you think are caused by this medicine, tell your doctor. Call your doctor for medical advice about side effects. You may report side effects to FDA at 3-538-FDA-0756 © 2017 [address] Information is for End User's use only and may not be sold, redistributed or otherwise used for commercial purposes. The above information is an  only. It is not intended as medical advice for individual conditions or treatments. Talk to your doctor, nurse or pharmacist before following any medical regimen to see if it is safe and effective for you. Please provide this summary of care documentation to your next provider. Signatures-by signing, you are acknowledging that this After Visit Summary has been reviewed with you and you have received a copy. Patient Signature:  ____________________________________________________________ Date:  ____________________________________________________________  
  
Mary Salgado Provider Signature:  ____________________________________________________________ Date:  ____________________________________________________________

## 2018-01-23 NOTE — ANESTHESIA PREPROCEDURE EVALUATION
Anesthetic History   No history of anesthetic complications            Review of Systems / Medical History  Patient summary reviewed and pertinent labs reviewed    Pulmonary  Within defined limits                 Neuro/Psych         Psychiatric history     Cardiovascular    Hypertension: well controlled                   GI/Hepatic/Renal  Within defined limits              Endo/Other    Diabetes: poorly controlled, type 2    Obesity     Other Findings   Comments: Documentation of current medication  Current medications obtained, documented and obtained? YES      Risk Factors for Postoperative nausea/vomiting:       History of postoperative nausea/vomiting? NO       Female? NO       Motion sickness? NO       Intended opioid administration for postoperative analgesia? YES      Smoking Abstinence:  Current Smoker? NO  Elective Surgery? NO  Seen preoperatively by anesthesiologist or proxy prior to day of surgery? YES  Pt abstained from smoking 24 hours prior to anesthesia?  N/A    Preventive care/screening for High Blood Pressure:  Aged 18 years and older: YES  Screened for high blood pressure: YES  Patients with high blood pressure referred to primary care provider   for BP management: YES                 Physical Exam    Airway  Mallampati: II  TM Distance: 4 - 6 cm  Neck ROM: normal range of motion   Mouth opening: Normal     Cardiovascular  Regular rate and rhythm,  S1 and S2 normal,  no murmur, click, rub, or gallop             Dental  No notable dental hx       Pulmonary                 Abdominal  GI exam deferred       Other Findings            Anesthetic Plan    ASA: 3, emergent  Anesthesia type: general          Induction: Intravenous  Anesthetic plan and risks discussed with: Patient

## 2018-01-23 NOTE — CONSULTS
[address] [facility], [address] [facility]    [phone]                                           1/23/2018                   Urology Consultation      Assessment:     Neisha Sanchez is a 71 y.o. white male with 7 mm distal rt ureteral stone, DM and mild dementia       Plan:     - NPO  - Transferred from [facility] from NH  - Urine C&S  - Rocephin given pre op  - Cysto, rt retrograde, rt JJ    I discussed the evaluation, treatment and usual course of the diagnosis with the patient. The patient verbalizes understanding and agrees with the plan of care. All questions were answered. Thank you for allowing us to help in the care of this patient. Lisa Wilson MD    [phone] (pager)          Chief Complaint: kidney stone     Subjective:     Neisha Sanchez is a 71 y.o. white male who was admitted to the hospital on 1/23/2018 with a diagnosis of kidney stone. He is admitted by medicine. Additional medical issues addressed during this hospitalization include   Patient Active Problem List    Diagnosis Date Noted    Nephrolithiasis 01/23/2018    HTN (hypertension) 01/23/2018    Type II diabetes mellitus ([facility] [address] [address] 01/23/2018    Cardiac murmur 01/23/2018    History of myocardial infarction 01/23/2018    Alzheimer's dementia 01/23/2018    Depression 01/23/2018   . The patient now is referred for a urologic evaluation of rt 7 mm distal rt ureteral stone transferred from [facility]. He has a hx of DM and resides in a NH. He denies any nearby family. He does have mild dementia. His Cr was 1.67 and WBC 10.3. Glucose has been elevated. He previously had pain but this was controlled in the [facility]. CT showed mild hydro with a 7 mm distal stone. The patient's voiding pattern includes no frequency, urgency, dysuria, hematuria, nocturia or any incontience. He reports a good stream and feels like he empties completely. The pt has not been seen by a urologist previously.   He is unaware of prior stones. The patient has no history of  realted trauma/infections. No prior history of Kidney/Bladder/Prostate issues. No history of kidney stones. The patient does not currently report any fever, chills, nausea, vomiting, diarrhea, constipation, blurred vision, headache, difficulty swallowing, chest pain, SOB, abd pain, LE edema. Past Medical History:   Diagnosis Date    Diabetes ([address]     HTN (hypertension)        History reviewed. No pertinent surgical history. History reviewed. No pertinent family history. Social History     Social History    Marital status: SINGLE     Spouse name: N/A    Number of children: N/A    Years of education: N/A     Occupational History    Not on file.      Social History Main Topics    Smoking status: Former Smoker    Smokeless tobacco: Never Used    Alcohol use Yes      Comment: occasionally    Drug use: No    Sexual activity: Not on file     Other Topics Concern    Not on file     Social History Narrative    No narrative on file     Not on File  Current Facility-Administered Medications   Medication Dose Route Frequency Provider Last Rate Last Dose    0.9% sodium chloride infusion  25 mL/hr IntraVENous CONTINUOUS Celeste Romero NP 25 mL/hr at 01/23/18 1514 25 mL/hr at 01/23/18 1514    lidocaine (PF) (XYLOCAINE) 10 mg/mL (1 %) injection 0.1 mL  0.1 mL SubCUTAneous PRN Austyn Conner MD        sodium chloride (NS) flush 5-10 mL  5-10 mL IntraVENous Q8H Austyn Conner MD        sodium chloride (NS) flush 5-10 mL  5-10 mL IntraVENous PRN Austyn Conner MD        famotidine (PEPCID) tablet 20 mg  20 mg Oral ONCE Main Osorio MD        insulin lispro (HUMALOG) injection   SubCUTAneous ONCE Austyn Conner MD        glucose chewable tablet 16 g  4 Tab Oral PRN Austyn Conner MD        glucagon (GLUCAGEN) injection 1 mg  1 mg IntraMUSCular PRN Austyn Conner MD        dextrose (D50W) injection syrg 12.5-25 g  25-50 mL IntraVENous PRN Lauren Arnett MD           Review of symptoms  See HPI     Objective:       Physical Exam:    Visit Vitals    /74    Pulse 92    Temp 97.5 °F (36.4 °C)    Resp 18    SpO2 98%     No intake or output data in the 24 hours ending 01/23/18 1522      GEN: NAD, Nonlabored, A&O x 3 but unsure of why admitted  CHEST: Bilateral excursion  ABD: Obese, Soft/NT/ND, no peritoneal signs  BACK: No spinal tenderness, No CVAT Bilaterally  : 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 CT Abd/Pelvis Impression     Per report with a 7 mm distal rt stone and mild hydro    Imaging studies were reviewed and I agree with the official interpretation.

## 2018-01-23 NOTE — PERIOP NOTES
TRANSFER - OUT REPORT:    Verbal report given to TANYA Leary(name) on Ophelia Ponce  being transferred to 2200(unit) for routine post - op       Report consisted of patients Situation, Background, Assessment and   Recommendations(SBAR). Information from the following report(s) SBAR, Kardex, OR Summary, Intake/Output, MAR and Recent Results was reviewed with the receiving nurse. Lines:   Peripheral IV 01/23/18 Right Antecubital (Active)   Site Assessment Clean, dry, & intact 1/23/2018  4:30 PM   Phlebitis Assessment 0 1/23/2018  4:30 PM   Infiltration Assessment 0 1/23/2018  4:30 PM   Dressing Status Clean, dry, & intact 1/23/2018  4:30 PM   Dressing Type Transparent;Tape 1/23/2018  4:30 PM   Hub Color/Line Status Green;Flushed; Infusing 1/23/2018  4:30 PM        Opportunity for questions and clarification was provided.       Patient transported with:   Registered Nurse

## 2018-01-23 NOTE — PERIOP NOTES
1630  Patient received in PACU and connected to monitors. Vital signs stable. RN at bedside. Will continue to monitor. [address] to Dr. Giovanni Rothman via phone for ADT order. 1735  Pt tolerating sips of diet gingerale. Awaiting room assignment. Spoke to pt's daughter via phone in waiting area re update and plan of care. 1800  Updated pt's daughter with room assignment. Pt medicated per [facility] for c/o \"my pee-pee still hurts\". Talked with pt multiple times re surgical procedure, post op expectations, etc.    [address], NP, talking at length with pt at bedside re procedure details, what to expect post op, etc.    Earl Kohli  Paged Dr. Giovanni Rothman re pt continuing to c/o pain at catheter site.       1835  Xylocaine jelly applied by Margret Whitehead, GISELLA.

## 2018-01-23 NOTE — ANESTHESIA POSTPROCEDURE EVALUATION
Post-Anesthesia Evaluation and Assessment    Patient: Jerad Reynolds MRN: [id number]  SSN: [id number]    YOB: 1945  Age: 71 y.o. Sex: male      Data from PACU flowsheet    Cardiovascular Function/Vital Signs  Visit Vitals    /84    Pulse 82    Temp 36.1 °C (97 °F)    Resp 14    SpO2 99%       Patient is status post general anesthesia for Procedure(s):  CYSTOSCOPY URETERAL right  JJ STENT INSERTION ,retrogrades,uethral dilation. Nausea/Vomiting: controlled    Postoperative hydration reviewed and adequate. Pain:  Pain Scale 1: Visual (01/23/18 1715)  Pain Intensity 1: 0 (01/23/18 1715)   Managed      Mental Status and Level of Consciousness: Alert and oriented     Pulmonary Status:   O2 Device: Nasal cannula (01/23/18 1644)   Adequate oxygenation and airway patent    Complications related to anesthesia: None    Post-anesthesia assessment completed.  No concerns    Signed By: Elgie Meigs, CRNA     January 23, 2018

## 2018-01-23 NOTE — ED NOTES
Patient was transferred from [facility] for Urology consult due to a right side Kidney stone in CT scan. Per report Patient is in [facility] for rehab post fall. Patient was seen in [facility] ED for abdominal pain and CT scan showed right sided kidney stone.   Patient was given flomax, toradol and dilaudid at [facility].

## 2018-01-23 NOTE — PROGRESS NOTES
TRANSFER - IN REPORT:    Verbal report received from [facility] on Misha Canas  being received from PACU for routine post - op      Report consisted of patients Situation, Background, Assessment and   Recommendations(SBAR). Information from the following report(s) SBAR and OR Summary was reviewed with the receiving nurse. Opportunity for questions and clarification was provided. 1855 Received pt via bed awake and alert. Thin bloody drainage from penis around catheter. Urine dark feng in color. Wearing O2 via N/C at 2L Oriented to call bell, phone and IS with pt giving return demonstration.

## 2018-01-23 NOTE — PROCEDURES
Cystoscopy, Retrograde Pyelograms, Double J Stent Operative Note    Preoperative Diagnosis:  7 mm distal right ureteral stone, rt ureteral obstruction, UTI, buried penis    Postoperative Diagnosis: Same with meatal stenosis    Procedure:  Cystoscopy, urethral/meatal dilation,  right Retrograde Pyelograms, right Double J Stent    Surgeon(s):  Lian Bass MD   Assistant: None  Anesthesia:  General  EBL:  Minimal  Tubes and Drains:  7 Fr x 26 cm JJ Optima    18 Fr cararnza  Specimen(s):  Cx from rt renal pelvis and bladder    Complications:  none    Description of Procedure: The patient was identified and surgical site verification was performed prior to obtaining consent. The patient was brought to the cystoscopy suite. Under adequate general anesthesia the patient was positioned in dorsal lithotomy and prepped and draped. A preoperative Time Out was performed addressing the anticipated surgical site, procedure, and safety precautions. Rocephin given pre op. The 22Fr cystoscope was inserted into the patient's urethral meatus and advanced to the bladder. The anterior urethra had a significant meatal stenosis with a buried penis. The glans was palpated and meatus identified. Sounds were used to dilate the distal urethra to 30 Fr. The posterior urethra showed mild BPH. .    The bladder was inspected. The ureteral orifices were in their normal anatomic positions. The bladder showed mild trabeculation. Bladder tumors were not noted. With a ureteral catheter, contrast was injected in retrograde fashion up the ureter. Imaging prior to the procedure showed a 7 mm stone in the distal ureter. The ureter was dilated above this area. The calyces were dilated. Cultures were obtained above the level of obstruction by the open-ended ureteral catheter and from the bladder upon insertion of the scope. Next, a guidewire was passed through the Right ureteral orifice up the ureter.   Over the guidewire, a jj stent measuring  7 Fr  x  26 cm was positioned in the ureter by fluoro guidance and direct vision. A string was left attached to the stent and cut short left in the bladder. The patient's bladder was drained via the cystoscope. An 18 fr carranza was placed to monitor output and passively dilate the meatus The patient was awakened and transferred to the recovery room in stable condition. Plan: IV antibiotic per med   Await cxs   Maintain cath until OOB   KUB prior to d/c. Pt may be a candidate for Rt ESWL vs rt ureteroscopy and laser litho.     Manda Suggs MD

## 2018-01-23 NOTE — H&P
GENERAL GENERIC H&P/CONSULT    Ophelia Ponce is a 71 y.o. male with history of DM and HTN who [facility] a [facility]. Patient underwent CT there showing Right kidney stone. Patient was sent over at the request of Urology for stent placement. Patient denies any sob, cp, n/v, fever, chills, current abd pain or any other acute compliants at this time    Past Medical History:   Diagnosis Date    Diabetes ([address]     HTN (hypertension)       No past surgical history on file. Prior to Admission medications    Not on File     Allergies not on file   Social History   Substance Use Topics    Smoking status: Not on file    Smokeless tobacco: Not on file    Alcohol use Not on file      No family history on file. Review of Systems   Constitutional: Negative for activity change, fatigue and unexpected weight change. HENT: Negative for dental problem and facial swelling. Eyes: Negative. Respiratory: Negative. Cardiovascular: Negative. Gastrointestinal: Negative for abdominal distention. Endocrine: Negative. Genitourinary: Negative. Neurological: Negative. Hematological: Negative. Objective:          Patient Vitals for the past 8 hrs:   BP Temp Pulse Resp SpO2   01/23/18 1354 - - - - 96 %   01/23/18 1353 130/72 97.5 °F (36.4 °C) 92 18 (!) 88 %     Physical Exam   Constitutional: He is oriented to person, place, and time. No distress. HENT:   Head: Normocephalic. Eyes: Pupils are equal, round, and reactive to light. Neck: Normal range of motion. Cardiovascular: Normal rate. Murmur heard. Pulmonary/Chest: Effort normal and breath sounds normal. No stridor. Abdominal: Soft. Bowel sounds are normal. He exhibits no distension. There is no rebound and no guarding. Musculoskeletal: Normal range of motion. Neurological: He is oriented to person, place, and time.    drowsy   Skin: Skin is warm and dry. He is not diaphoretic. Assessment:  Principal Problem:    Nephrolithiasis (1/23/2018)    Active Problems:    HTN (hypertension) (1/23/2018)      Type II diabetes mellitus ([address] (1/23/2018)      Cardiac murmur (1/23/2018)      History of myocardial infarction (1/23/2018)        Plan:    Nephrolithiasis  -transfer from [facility] for CT can showing right Renal stone   -to undergo stent placement per Urology  -asymptomatics  -OR time per urology  -prn pain meds  -UA C&S sent  -EKG  -no leukcoytosis   -hold on abx  -gentle hydration  -non toxic    Murmur  -will check ECHO  -again poor historian and no previous records available     Hx of MI  -noted patient is poor historian unclear of acutual details however states MI 4 years ago  -will need to find actual records of medications.     Essential HTN  -stable    DM II  -corrective insulin  -basal dosing will cut in half for now    Alzheimeirs  -memnantine     Depression  -zoloft    DVT prophylaxis  -scd/teds    Signed:  Gurinder Steel NP 1/23/2018

## 2018-01-24 LAB
ALBUMIN SERPL-MCNC: 2.8 G/DL (ref 3.4–5)
ALBUMIN/GLOB SERPL: 0.8 {RATIO} (ref 0.8–1.7)
ALP SERPL-CCNC: 105 U/L (ref 45–117)
ALT SERPL-CCNC: 14 U/L (ref 16–61)
ANION GAP SERPL CALC-SCNC: 4 MMOL/L (ref 3–18)
AST SERPL-CCNC: 11 U/L (ref 15–37)
ATRIAL RATE: 82 BPM
BASOPHILS # BLD: 0 K/UL (ref 0–0.06)
BASOPHILS NFR BLD: 0 % (ref 0–2)
BILIRUB SERPL-MCNC: 0.4 MG/DL (ref 0.2–1)
BUN SERPL-MCNC: 35 MG/DL (ref 7–18)
BUN/CREAT SERPL: 20 (ref 12–20)
CALCIUM SERPL-MCNC: 8.8 MG/DL (ref 8.5–10.1)
CALCULATED P AXIS, ECG09: 22 DEGREES
CALCULATED R AXIS, ECG10: -38 DEGREES
CALCULATED T AXIS, ECG11: 63 DEGREES
CHLORIDE SERPL-SCNC: 105 MMOL/L (ref 100–108)
CO2 SERPL-SCNC: 33 MMOL/L (ref 21–32)
CREAT SERPL-MCNC: 1.77 MG/DL (ref 0.6–1.3)
DIAGNOSIS, 93000: NORMAL
DIFFERENTIAL METHOD BLD: ABNORMAL
EOSINOPHIL # BLD: 0.3 K/UL (ref 0–0.4)
EOSINOPHIL NFR BLD: 3 % (ref 0–5)
ERYTHROCYTE [DISTWIDTH] IN BLOOD BY AUTOMATED COUNT: 16.7 % (ref 11.6–14.5)
EST. AVERAGE GLUCOSE BLD GHB EST-MCNC: 232 MG/DL
GLOBULIN SER CALC-MCNC: 3.5 G/DL (ref 2–4)
GLUCOSE BLD STRIP.AUTO-MCNC: 122 MG/DL (ref 70–110)
GLUCOSE BLD STRIP.AUTO-MCNC: 148 MG/DL (ref 70–110)
GLUCOSE BLD STRIP.AUTO-MCNC: 163 MG/DL (ref 70–110)
GLUCOSE BLD STRIP.AUTO-MCNC: 223 MG/DL (ref 70–110)
GLUCOSE BLD STRIP.AUTO-MCNC: 57 MG/DL (ref 70–110)
GLUCOSE BLD STRIP.AUTO-MCNC: 58 MG/DL (ref 70–110)
GLUCOSE SERPL-MCNC: 54 MG/DL (ref 74–99)
HBA1C MFR BLD: 9.7 % (ref 4.2–5.6)
HCT VFR BLD AUTO: 34.5 % (ref 36–48)
HGB BLD-MCNC: 10.6 G/DL (ref 13–16)
LYMPHOCYTES # BLD: 1.5 K/UL (ref 0.9–3.6)
LYMPHOCYTES NFR BLD: 17 % (ref 21–52)
MCH RBC QN AUTO: 27.7 PG (ref 24–34)
MCHC RBC AUTO-ENTMCNC: 30.7 G/DL (ref 31–37)
MCV RBC AUTO: 90.1 FL (ref 74–97)
MONOCYTES # BLD: 0.8 K/UL (ref 0.05–1.2)
MONOCYTES NFR BLD: 9 % (ref 3–10)
NEUTS SEG # BLD: 6.1 K/UL (ref 1.8–8)
NEUTS SEG NFR BLD: 71 % (ref 40–73)
P-R INTERVAL, ECG05: 172 MS
PLATELET # BLD AUTO: 233 K/UL (ref 135–420)
PMV BLD AUTO: 11 FL (ref 9.2–11.8)
POTASSIUM SERPL-SCNC: 4.9 MMOL/L (ref 3.5–5.5)
PROT SERPL-MCNC: 6.3 G/DL (ref 6.4–8.2)
Q-T INTERVAL, ECG07: 388 MS
QRS DURATION, ECG06: 112 MS
QTC CALCULATION (BEZET), ECG08: 453 MS
RBC # BLD AUTO: 3.83 M/UL (ref 4.7–5.5)
SODIUM SERPL-SCNC: 142 MMOL/L (ref 136–145)
VENTRICULAR RATE, ECG03: 82 BPM
WBC # BLD AUTO: 8.6 K/UL (ref 4.6–13.2)

## 2018-01-24 PROCEDURE — 74011250637 HC RX REV CODE- 250/637: Performed by: NURSE PRACTITIONER

## 2018-01-24 PROCEDURE — 65270000029 HC RM PRIVATE

## 2018-01-24 PROCEDURE — 74011000250 HC RX REV CODE- 250: Performed by: UROLOGY

## 2018-01-24 PROCEDURE — 77030011256 HC DRSG MEPILEX <16IN NO BORD MOLN -A

## 2018-01-24 PROCEDURE — 90471 IMMUNIZATION ADMIN: CPT

## 2018-01-24 PROCEDURE — 74011250636 HC RX REV CODE- 250/636: Performed by: UROLOGY

## 2018-01-24 PROCEDURE — 90686 IIV4 VACC NO PRSV 0.5 ML IM: CPT | Performed by: UROLOGY

## 2018-01-24 PROCEDURE — 99218 HC RM OBSERVATION: CPT

## 2018-01-24 PROCEDURE — 74011636637 HC RX REV CODE- 636/637: Performed by: HOSPITALIST

## 2018-01-24 PROCEDURE — 36415 COLL VENOUS BLD VENIPUNCTURE: CPT | Performed by: NURSE PRACTITIONER

## 2018-01-24 PROCEDURE — 74011250637 HC RX REV CODE- 250/637: Performed by: UROLOGY

## 2018-01-24 PROCEDURE — 93306 TTE W/DOPPLER COMPLETE: CPT

## 2018-01-24 PROCEDURE — 74011636637 HC RX REV CODE- 636/637: Performed by: NURSE PRACTITIONER

## 2018-01-24 PROCEDURE — 80053 COMPREHEN METABOLIC PANEL: CPT | Performed by: NURSE PRACTITIONER

## 2018-01-24 PROCEDURE — 85025 COMPLETE CBC W/AUTO DIFF WBC: CPT | Performed by: NURSE PRACTITIONER

## 2018-01-24 PROCEDURE — 82962 GLUCOSE BLOOD TEST: CPT

## 2018-01-24 PROCEDURE — 74011250636 HC RX REV CODE- 250/636: Performed by: NURSE PRACTITIONER

## 2018-01-24 RX ADMIN — OXYBUTYNIN CHLORIDE 5 MG: 5 TABLET ORAL at 08:55

## 2018-01-24 RX ADMIN — HYDROCODONE BITARTRATE AND ACETAMINOPHEN 1 TABLET: 5; 325 TABLET ORAL at 13:59

## 2018-01-24 RX ADMIN — INSULIN LISPRO 4 UNITS: 100 INJECTION, SOLUTION INTRAVENOUS; SUBCUTANEOUS at 22:01

## 2018-01-24 RX ADMIN — OXYBUTYNIN CHLORIDE 5 MG: 5 TABLET ORAL at 13:46

## 2018-01-24 RX ADMIN — MEMANTINE HYDROCHLORIDE 10 MG: 10 TABLET ORAL at 08:55

## 2018-01-24 RX ADMIN — CEFTRIAXONE 1 G: 1 INJECTION, POWDER, FOR SOLUTION INTRAMUSCULAR; INTRAVENOUS at 17:58

## 2018-01-24 RX ADMIN — ATROPA BELLADONNA AND OPIUM 1 SUPPOSITORY: 16.2; 3 SUPPOSITORY RECTAL at 04:27

## 2018-01-24 RX ADMIN — INFLUENZA VIRUS VACCINE 0.5 ML: 15; 15; 15; 15 SUSPENSION INTRAMUSCULAR at 14:00

## 2018-01-24 RX ADMIN — INSULIN LISPRO 2 UNITS: 100 INJECTION, SOLUTION INTRAVENOUS; SUBCUTANEOUS at 17:29

## 2018-01-24 RX ADMIN — SERTRALINE HYDROCHLORIDE 50 MG: 50 TABLET ORAL at 08:55

## 2018-01-24 RX ADMIN — DONEPEZIL HYDROCHLORIDE 10 MG: 5 TABLET, FILM COATED ORAL at 22:00

## 2018-01-24 RX ADMIN — MEMANTINE HYDROCHLORIDE 10 MG: 10 TABLET ORAL at 17:29

## 2018-01-24 RX ADMIN — DOCUSATE SODIUM 100 MG: 100 CAPSULE, LIQUID FILLED ORAL at 17:29

## 2018-01-24 RX ADMIN — OXYBUTYNIN CHLORIDE 5 MG: 5 TABLET ORAL at 17:29

## 2018-01-24 RX ADMIN — DOCUSATE SODIUM 100 MG: 100 CAPSULE, LIQUID FILLED ORAL at 08:55

## 2018-01-24 RX ADMIN — SODIUM CHLORIDE 25 ML/HR: 900 INJECTION, SOLUTION INTRAVENOUS at 13:48

## 2018-01-24 RX ADMIN — HYDROMORPHONE HYDROCHLORIDE 0.2 MG: 1 INJECTION, SOLUTION INTRAMUSCULAR; INTRAVENOUS; SUBCUTANEOUS at 03:54

## 2018-01-24 RX ADMIN — OXYBUTYNIN CHLORIDE 5 MG: 5 TABLET ORAL at 22:00

## 2018-01-24 RX ADMIN — INSULIN DETEMIR 12 UNITS: 100 INJECTION, SOLUTION SUBCUTANEOUS at 22:02

## 2018-01-24 NOTE — DIABETES MGMT
Diabetes Patient/Family Education Record    Factors That  May Influence Patients Ability  to Learn or  Comply with Recommendations   []   Language barrier    []   Cultural needs   []   Motivation    []   Cognitive limitation    []   Physical   []   Education    []   Physiological factors   []   Hearing/vision/speaking impairment   []   Orthodox beliefs    []   Financial factors   [x]  Other: confused at times  Alzheimers/depression   []  No factors identified at this time. Person Instructed:   [x]   Patient   []   Family   []  Other     Preference for Learning:   [x]   Verbal   [x]   Written   []  Demonstration     Level of Comprehension & Competence:    []  Good                                      [x] Fair                                     []  Poor                             []  Needs Reinforcement   [x]  Teachback completed    Education Component:   [x]  Medication management, including how to administer insulin (if appropriate) and potential medication interactions States he takes Lantus 50 units every night. He does not miss doses. States he splits the dose and gives two 25 units. Reviewed correct pen technique with patient. Patient does not change his pen needle every day. He changes it after every 4th injection \"because those needles are expensive\". States he does leave the needle in his abdomen for a few seconds to ensure the entire dose was delivered. [x]  Nutritional management \"I don't eat three meals a day. \"  States if he eats a \"good\" breakfast, he won't eat again until dinner. Encouraged patient to have at least a midday snack. []  Exercise   [x]  Signs, symptoms, and treatment of hyperglycemia and hypoglycemia   [x] Prevention, recognition and treatment of hyperglycemia and hypoglycemia   [x]  Importance of blood glucose monitoring and how to obtain a blood glucose meter Has a glucometer and supplies at home.  States he only checks his blood glucose every so often.  Encouraged patient to check his BG at least every morning to see if it is in the target range of  mg/dl and twice a week after any meal to see if his BG is less than 180 mg/dl.   Patient states understanding.   []  Instruction on use of the blood glucose meter   [x]  Discuss the importance of HbA1C monitoring 9.7% equivalent to an average blood glucose of 232 mg/dl over the past 2-3 months   []  Sick day guidelines   [x]  Proper use and disposal of lancets, needles, syringes or insulin pens (if appropriate)   [x]  Potential long-term complications (retinopathy, kidney disease, neuropathy, foot care)   [x] Information about whom to contact in case of emergency or for more information    [x]  Goal:  Patient/family will demonstrate understanding of Diabetes Self Management Skills by: (date) __01/30/18_____  Plan for post-discharge education or self-management support:    [] Outpatient class schedule provided            [x] Patient Declined    [] Scheduled for outpatient classes (date) _______

## 2018-01-24 NOTE — PROGRESS NOTES
conducted an initial consultation and Spiritual Assessment for Sin Saunders, who is a 71 y.o.,male. Patients Primary Language is: [place]. According to the patients EMR Moravian Affiliation is: No Rastafari. The reason the Patient came to the hospital is:   Patient Active Problem List    Diagnosis Date Noted    Nephrolithiasis 01/23/2018    HTN (hypertension) 01/23/2018    Type II diabetes mellitus ([address] 01/23/2018    Cardiac murmur 01/23/2018    History of myocardial infarction 01/23/2018    Alzheimer's dementia 01/23/2018    Depression 01/23/2018    Status post cystoscopy 01/23/2018        The  provided the following Interventions:  Initiated a relationship of care and support with patient in room 2206. Listened empathically as he talked about being here and his hopes of a speedy recovery and release time. Provided information about Spiritual Care Services. Offered prayer and assurance of continued prayers on patients behalf. The following outcomes were achieved:  Patient shared limited information about his medical narrative and spiritual journey/beliefs. Patient processed feeling about current hospitalization. Patient expressed gratitude for pastoral care visit. Assessment:  Patient does not have any Sabianism/cultural needs that will affect patients preferences in health care. There are no further spiritual or Sabianism issues which require Spiritual Care Services interventions at this time. Plan:  Chaplains will continue to follow and will provide pastoral care on an as needed/requested basis    . Carolina Verduzco   Spiritual Care   [phone]

## 2018-01-24 NOTE — PROGRESS NOTES
I have placed the patient in [place] and matched him to Vida Brizuela in [facility]. I have obtained a \"received\" message only form Ramsey Montenegro in [place]. I called to speak to Ms Anurag Gipson and was told she would be out of the office until Friday. I left a message for the  when I asked to speak to someone from admission. I have placed a called and left a message for the patients daughter and asked that she return my call.  Nishant Araiza RN

## 2018-01-24 NOTE — PROGRESS NOTES
Patients daughter called and verified that the patient is in rehab at [facility] on the [address]. The daughter agrees to allow case management to arrange transportation with [facility] at discharge. I have explained it is uncertain if Medicare will cover the cost of the patients transportation, she stated she was confident that it would be covered because the patient  in a skilled facility.  Quinten Little RN

## 2018-01-24 NOTE — PHYSICIAN ADVISORY
Letter of Admission Status Determination: Upgrade to Inpatient     Brigida Franco was hospitalized as observation status on 1/23/2018. His hospital stay has already crossed one medically necessary midnight for management of symptomatic nephrolithiasis requiring stent placement, IV hydration, further evaluation, and monitoring. On day 2, ongoing hospitalization is warranted for this patient with hematuria, concern for infection, uncontrolled diabetes based on current plans including continued IV fluids, empiric IV antibiotics pending cultures, analgesics, further evaluation, and careful monitorin. Since Mr. Brigida Franco is expected to need at least two midnights of medically necessary hospital care, he will meet the benchmark for Inpatient Admission status in accordance with CMS regulation Section 43 .3. Based on the documented clinical condition and care plan, we recommend upgrading patient's hospitalization status from Observation to INPATIENT status. The final decision regarding the patient's hospitalization status depends on the attending physician's clinical judgment.        Hayde Paris MD, MARIA ESTHER, [place] [facility]  Physician [place].  [phone]    January 24, 2018   11:19 AM

## 2018-01-24 NOTE — WOUND CARE
Received IP WOUND CARE CONSULT per Dr. Jasmin Lugo, reviewed EMR, visited patient at bedside. Patient resting in bed, no distress noted. Assessed sacrum, and bilateral buttocks. Left buttocks has stage 2 pressure injury noted with partial scab, small amount of drainage noted, no odor. Blanchable erythema noted around wound opening. Patient stated \"I've had had it for years, is it cancer? \". Cleansed open wound with DWC and dried with 4 x 4 gauze. Covered with zinc ointment, placed blue pad underneath patient, and encouraged patient to change positions in bed frequently. Patient verbalized understanding. Reported findings and wound care to [facility]. PLEASE SEE WOUND CARE ORDERS:               01/24/18 1158   Wound Buttocks Posterior; Left   Date First Assessed/Time First Assessed: 01/23/18 2011   POA: Yes  Wound Type: Pressure injury  Location: Buttocks  Orientation: Posterior; Left   DRESSING STATUS Removed   DRESSING TYPE Foam   Pressure Injury Stage ll   Wound Length (cm) 5 cm   Wound Width (cm) 0.5 cm   Wound Depth (cm) 0.1   Wound Surface area (cm^3) 0.25 cm^2   Condition of Base Pink   Condition of Edges Rolled/curled; Open   Tissue Type Pink   Tissue Type Percent Pink 100   Drainage Amount  Scant   Drainage Color Serous   Wound Odor None   Periwound Skin Condition Erythema, blanchable   Cleansing and Cleansing Agents  Dermal wound cleanser   Dressing Type Applied Zinc based paste   Procedure Tolerated Well

## 2018-01-24 NOTE — PROGRESS NOTES
[employer]   Discharge Planning/ Assessment    Reasons for Intervention: Interviewed patient, he agrees to share his discharge information with his daughter, see below. He states his daughter work at [employer] on the [facility]. Demographic information verified. He states he is seen by Dr Syeda Chacon for his primary care needs. He was transported from [facility] to the ED department at [facility]. He states he is currently in rehab at [facility] on the [facility]. He states his home address is [facility] , he is unsure of the zip code. His discharge plan is to return to rehab at [facility]. Placed in e discharge and matched to [facility] on the [facility].      High Risk Criteria  [x] Yes  []No   Physician Referral  [] Yes  [x]No        Date    Nursing Referral  [] Yes  [x]No        Date    Patient/Family Request  [] Yes  [x]No        Date       Resources:    Medicare  [x] Yes  []No   Medicaid  [] Yes  [x]No   No Resources  [] Yes  [x]No   Private Insurance  [x] Yes  []No Blue cross    Name/Phone Number    Other  [] Yes  [x]No        (i.e. Workman's Comp)         Prior Services:    Prior Services  [x] Yes  []No   Home Health  [] Yes  [x]No   [address]  [] Yes  [x]No        Number of [address]  [] Yes  [x]No       Meals on Wheels  [] Yes  [x]No   Office on Aging  [] Yes  [x]No   Transportation Services  [] Yes  [x]No   Nursing Home  [x] Yes  []No        Nursing Home Name [address]  [] Yes  [x]No        [address] Name    Other       Information Source:      Information obtained from  [x] Patient  [] Parent   [] Katie Means  [] Child  [] Spouse   [] Significant Other/Partner   [] Friend      [] EMS    [] Nursing Home Chart          [x] Other: chart   Chart Review  [x] Yes  []No     Family/Support System:    Patient lives with  [] Alone    [] Spouse [] Significant Other  [] Children  [] Caretaker   [] Parent  [] Sibling     [] Other       Other Support System:    Is the patient responsible for care of others  [] Yes  [x]No   Information of person caring for patient on  discharge daughter   Managers financial affairs independently  [x] Yes  []No   If no, explain:      Status Prior to Admission:    Mental Status  [x] Awake  [x] Alert  [] Oriented  [] Quiet/Calm [] Lethargic/Sedated   [] Disoriented  [] Restless/Anxious  [] Combative   Personal Care  [] Dependent  [] [address]  [x] Requires Assistance   Meal Preparation Ability  [] Independent   [] Standby Assistance   [] Minimal Assistance   [x] Moderate Assistance  [] Maximum Assistance     [] Total Assistance   Chores  [] Independent with Chores   [] N/A Nursing Home Resident   [x] Requires Assistance   Bowel/Bladder  [] Continent  [] Catheter  [x] Incontinent  [] Ostomy Self-Care    [] Urine Diversion Self-Care  [] Maximum Assistance     [] Total Assistance   Number of Persons needed for assistance    DME at home  [] Myah Sanchez  [] Jay Sanchez   [] Commode    [] Bathroom/Grab Bars  [] Hospital Bed  [] Nebulizer  [] Oxygen           [] Raised Toilet Seat  [] Shower Chair  [] Side Rails for Bed   [] Tub Transfer Bench   [] Low Bell  [] Chari Garcia      [] Other:   Vendor      Treatment Presently Receiving:    Current Treatments  [] Chemotherapy  [] Dialysis  [] Insulin  [] IVAB [x] IVF   [] O2  [] PCA   [] PT   [] RT   [] Tube Feedings   [] Wound Care     Psychosocial Evaluation:    Verbalized Knowledge of Disease Process  [x] Patient  [x]Family   Coping with Disease Process  [x] Patient  [x]Family   Requires Further Counseling Coping with Disease Process  [] Patient  []Family     Identified Projected Needs:    Home Health Aid  [] Yes  [x]No   Transportation  [x] Yes  []No   Education  [] Yes  [x]No        Specific Education     Financial Counseling  [] Yes  [x]No   Inability to Care for Self/Will Require 24 hour care  [] Yes  [x]No   Pain Management  [] Yes  [x]No   Home Infusion Therapy  [] Yes  [x]No   Oxygen Therapy  [] Yes  [x]No   DME  [] Yes  [x]No   Long Term Care Placement  [] Yes  [x]No   Rehab  [x] Yes  []No   Physical Therapy  [x] Yes  []No   Needs Anticipated At This Time  [x] Yes  []No     Intra-Hospital Referral:    [facility]  [] Yes  [x]No     [] Yes  [x]No   Patient Representative  [] Yes  [x]No   Staff for Teaching Needs  [] Yes  [x]No   Specialty Teaching Needs     Diabetic Educator  [] Yes  [x]No   Referral for Diabetic Educator Needed  [] Yes  [x]No  If Yes, place order for Nutritionist or Diabetic Consult     Tentative Discharge Plan:    Home with No Services  [x] Yes  []No   Home with [address]  [] Yes  [x]No        If Yes, specify type    Home Care Program  [] Yes  [x]No        If Yes, specify type    Meals on Wheels  [] Yes  [x]No   Office of Aging  [] Yes  [x]No   NHP  [] Yes  [x]No   Return to the Nursing Home  [] Yes  [x]No   Rehab Therapy  [x] Yes  []No   Acute Rehab  [] Yes  [x]No   Subacute Rehab  [x] Yes  []No   Private Care  [] Yes  [x]No   Substance Abuse Referral  [] Yes  [x]No   Transportation  [] Yes  [x]No   Chore Service  [] Yes  [x]No   Inpatient Hospice  [] Yes  [x]No   OP RT  [] Yes  [x] No   OP Hemo  [] Yes  [x] No   OP PT  [] Yes  [x]No   Support Group  [] Yes  [x]No   Reach to Recovery  [] Yes  [x]No   OP Oncology Clinic  [] Yes  [x]No   Clinic Appointment  [] Yes  [x]No   DME  [] Yes  [x]No   Comments    Name of D/C Planner or  Given to Patient or Family Vicky Christopher RN   Phone Number [phone] extension [phone]   Date Jan 24, 2018   Time 701 am   If you are discharged home, whom do you designate to participate in your discharge plan and receive any information needed?      Enter name of Francois Buck  daughter        Phone # of alex         Address of alex Updated Jan 24, 2018        Patient refused to designate any           individual

## 2018-01-24 NOTE — PROGRESS NOTES
Urology Progress Note        Assessment/Plan:     Principal Problem:    Nephrolithiasis (2018)    Active Problems:    HTN (hypertension) (2018)      Type II diabetes mellitus ([address] (2018)      Cardiac murmur (2018)      History of myocardial infarction (2018)      Alzheimer's dementia (2018)      Depression (2018)      Status post cystoscopy (2018)    7 mm right distal ureteral stone    Status Post:  Procedure(s):  CYSTOSCOPY URETERAL right  JJ STENT INSERTION ,retrogrades,uethral dilation 18    Plan:    1. Cont Rocephin pending cx's  2. Cont carranza until urine clears and getting OOB. Will need to stop Ditropan prior  3. Definitive stone tx later    Subjective:     Daily Progress Note: 2018 9:18 AM    Vilma Martinez is 1 Day Post-Op and doing satisfactory. He reports pain is well controlled. He has no complaints. He is tolerating a solid diet and requiring help to get out of bed. Indwelling catheter is draining well. Objective:     Visit Vitals    /65 (BP 1 Location: Right arm, BP Patient Position: At rest)    Pulse 77    Temp 97.6 °F (36.4 °C)    Resp 18    SpO2 98%        Temp (24hrs), Av.8 °F (36.6 °C), Min:97 °F (36.1 °C), Max:98.8 °F (37.1 °C)      Intake and Output:  1901 -  0700  In: 090 [P.O.:350; I.V.:500]  Out: 385 [Urine:385]   07 -  190  In: -   Out: 200 [Urine:200]    Physical Exam:   General appearance: fatigued, cooperative, no distress, appears older than stated age  Abdomen: soft, non-tender.  . No masses,  no organomegaly    Carranza in place with red urine, no clots    Data Review:    Recent Results (from the past 24 hour(s))   CBC WITH AUTOMATED DIFF    Collection Time: 18  2:52 PM   Result Value Ref Range    WBC 7.1 4.6 - 13.2 K/uL    RBC 3.76 (L) 4.70 - 5.50 M/uL    HGB 10.1 (L) 13.0 - 16.0 g/dL    HCT 32.5 (L) 36.0 - 48.0 %    MCV 86.4 74.0 - 97.0 FL    MCH 26.9 24.0 - 34.0 PG    MCHC 31.1 31.0 - 37.0 g/dL    RDW 16.2 (H) 11.6 - 14.5 %    PLATELET 863 961 - 543 K/uL    MPV 10.5 9.2 - 11.8 FL    NEUTROPHILS 78 (H) 40 - 73 %    LYMPHOCYTES 11 (L) 21 - 52 %    MONOCYTES 10 3 - 10 %    EOSINOPHILS 1 0 - 5 %    BASOPHILS 0 0 - 2 %    ABS. NEUTROPHILS 5.6 1.8 - 8.0 K/UL    ABS. LYMPHOCYTES 0.8 (L) 0.9 - 3.6 K/UL    ABS. MONOCYTES 0.7 0.05 - 1.2 K/UL    ABS. EOSINOPHILS 0.0 0.0 - 0.4 K/UL    ABS.  BASOPHILS 0.0 0.0 - 0.06 K/UL    DF AUTOMATED     METABOLIC PANEL, BASIC    Collection Time: 01/23/18  2:52 PM   Result Value Ref Range    Sodium 141 136 - 145 mmol/L    Potassium 5.1 3.5 - 5.5 mmol/L    Chloride 103 100 - 108 mmol/L    CO2 34 (H) 21 - 32 mmol/L    Anion gap 4 3.0 - 18 mmol/L    Glucose 187 (H) 74 - 99 mg/dL    BUN 33 (H) 7.0 - 18 MG/DL    Creatinine 1.68 (H) 0.6 - 1.3 MG/DL    BUN/Creatinine ratio 20 12 - 20      GFR est AA 49 (L) >60 ml/min/1.73m2    GFR est non-AA 40 (L) >60 ml/min/1.73m2    Calcium 8.7 8.5 - 10.1 MG/DL   URINALYSIS W/ RFLX MICROSCOPIC    Collection Time: 01/23/18  2:52 PM   Result Value Ref Range    Color DARK YELLOW      Appearance CLOUDY      Specific gravity 1.026 1.005 - 1.030      pH (UA) 5.0 5.0 - 8.0      Protein 300 (A) NEG mg/dL    Glucose 500 (A) NEG mg/dL    Ketone TRACE (A) NEG mg/dL    Bilirubin NEGATIVE  NEG      Blood LARGE (A) NEG      Urobilinogen 1.0 0.2 - 1.0 EU/dL    Nitrites NEGATIVE  NEG      Leukocyte Esterase SMALL (A) NEG     URINE MICROSCOPIC ONLY    Collection Time: 01/23/18  2:52 PM   Result Value Ref Range    WBC 6 to 8 0 - 4 /hpf    RBC TOO NUMEROUS TO COUNT 0 - 5 /hpf    Epithelial cells FEW 0 - 5 /lpf    Bacteria 1+ (A) NEG /hpf    Yeast 1+ (A) NEG   HEMOGLOBIN A1C WITH EAG    Collection Time: 01/23/18  2:52 PM   Result Value Ref Range    Hemoglobin A1c 9.7 (H) 4.2 - 5.6 %    Est. average glucose 232 mg/dL   GLUCOSE, POC    Collection Time: 01/23/18  3:18 PM   Result Value Ref Range    Glucose (POC) 200 (H) 70 - 110 mg/dL   EKG, 12 LEAD, INITIAL Collection Time: 01/23/18  3:29 PM   Result Value Ref Range    Ventricular Rate 82 BPM    Atrial Rate 82 BPM    P-R Interval 172 ms    QRS Duration 112 ms    Q-T Interval 388 ms    QTC Calculation (Bezet) 453 ms    Calculated P Axis 22 degrees    Calculated R Axis -38 degrees    Calculated T Axis 63 degrees    Diagnosis       Sinus rhythm with premature atrial complexes with aberrant conduction  Left anterior fascicular block  Septal infarct , age undetermined Possible  Abnormal ECG  No previous ECGs available  Confirmed by Arnie White ([phone]) on 1/24/2018 8:50:23 AM     GLUCOSE, POC    Collection Time: 01/23/18  4:37 PM   Result Value Ref Range    Glucose (POC) 164 (H) 70 - 110 mg/dL   GLUCOSE, POC    Collection Time: 01/23/18  9:08 PM   Result Value Ref Range    Glucose (POC) 92 70 - 895 mg/dL   METABOLIC PANEL, COMPREHENSIVE    Collection Time: 01/24/18  5:30 AM   Result Value Ref Range    Sodium 142 136 - 145 mmol/L    Potassium 4.9 3.5 - 5.5 mmol/L    Chloride 105 100 - 108 mmol/L    CO2 33 (H) 21 - 32 mmol/L    Anion gap 4 3.0 - 18 mmol/L    Glucose 54 (LL) 74 - 99 mg/dL    BUN 35 (H) 7.0 - 18 MG/DL    Creatinine 1.77 (H) 0.6 - 1.3 MG/DL    BUN/Creatinine ratio 20 12 - 20      GFR est AA 46 (L) >60 ml/min/1.73m2    GFR est non-AA 38 (L) >60 ml/min/1.73m2    Calcium 8.8 8.5 - 10.1 MG/DL    Bilirubin, total 0.4 0.2 - 1.0 MG/DL    ALT (SGPT) 14 (L) 16 - 61 U/L    AST (SGOT) 11 (L) 15 - 37 U/L    Alk.  phosphatase 105 45 - 117 U/L    Protein, total 6.3 (L) 6.4 - 8.2 g/dL    Albumin 2.8 (L) 3.4 - 5.0 g/dL    Globulin 3.5 2.0 - 4.0 g/dL    A-G Ratio 0.8 0.8 - 1.7     CBC WITH AUTOMATED DIFF    Collection Time: 01/24/18  5:30 AM   Result Value Ref Range    WBC 8.6 4.6 - 13.2 K/uL    RBC 3.83 (L) 4.70 - 5.50 M/uL    HGB 10.6 (L) 13.0 - 16.0 g/dL    HCT 34.5 (L) 36.0 - 48.0 %    MCV 90.1 74.0 - 97.0 FL    MCH 27.7 24.0 - 34.0 PG    MCHC 30.7 (L) 31.0 - 37.0 g/dL    RDW 16.7 (H) 11.6 - 14.5 %    PLATELET 476 908 - 420 K/uL    MPV 11.0 9.2 - 11.8 FL    NEUTROPHILS 71 40 - 73 %    LYMPHOCYTES 17 (L) 21 - 52 %    MONOCYTES 9 3 - 10 %    EOSINOPHILS 3 0 - 5 %    BASOPHILS 0 0 - 2 %    ABS. NEUTROPHILS 6.1 1.8 - 8.0 K/UL    ABS. LYMPHOCYTES 1.5 0.9 - 3.6 K/UL    ABS. MONOCYTES 0.8 0.05 - 1.2 K/UL    ABS. EOSINOPHILS 0.3 0.0 - 0.4 K/UL    ABS.  BASOPHILS 0.0 0.0 - 0.06 K/UL    DF AUTOMATED     GLUCOSE, POC    Collection Time: 01/24/18  6:53 AM   Result Value Ref Range    Glucose (POC) 59 (L) 70 - 110 mg/dL   GLUCOSE, POC    Collection Time: 01/24/18  6:55 AM   Result Value Ref Range    Glucose (POC) 58 (L) 70 - 110 mg/dL   GLUCOSE, POC    Collection Time: 01/24/18  7:20 AM   Result Value Ref Range    Glucose (POC) 57 (L) 70 - 110 mg/dL   GLUCOSE, POC    Collection Time: 01/24/18  8:03 AM   Result Value Ref Range    Glucose (POC) 122 (H) 70 - 110 mg/dL     Cx's pending     Signed By:     Levar Todd MD   Urologic Oncologist  [facility]  Professor of Urology  [facility]  Office [phone] Ext [phone]                          January 24, 2018

## 2018-01-24 NOTE — PROGRESS NOTES
Progress Note      Patient: Silvana Jessica               Sex: male          DOA: 1/23/2018       YOB: 1945      Age:  71 y.o.        LOS:  LOS: 0 days               Subjective:   Pt is s/p cystoscopy and right  Retrograde pyelogram and right double J stent he has a 7 mm distal right  Ureteral stone  with right ureteral obsrtruction. The pt is demented with significant short term memory loss . He is otherwise comfortable and the kidney stone will be removed at at a later date . There are gram negative rods seen in the urine . Pt is on rocephin    Objective:      Visit Vitals    /65 (BP 1 Location: Right arm, BP Patient Position: At rest)    Pulse 77    Temp 97.6 °F (36.4 °C)    Resp 18    SpO2 98%       Physical Exam:  Pt is awake and is responsive . Heart rate and rhythm   Lungs good breath sounds herd   Abdomen soft and no pain on palpation   Neuro demented .        Lab/Data Reviewed:  BMP:   Lab Results   Component Value Date/Time     01/24/2018 05:30 AM    K 4.9 01/24/2018 05:30 AM     01/24/2018 05:30 AM    CO2 33 (H) 01/24/2018 05:30 AM    AGAP 4 01/24/2018 05:30 AM    GLU 54 (LL) 01/24/2018 05:30 AM    BUN 35 (H) 01/24/2018 05:30 AM    CREA 1.77 (H) 01/24/2018 05:30 AM    GFRAA 46 (L) 01/24/2018 05:30 AM    GFRNA 38 (L) 01/24/2018 05:30 AM     CMP:   Lab Results   Component Value Date/Time     01/24/2018 05:30 AM    K 4.9 01/24/2018 05:30 AM     01/24/2018 05:30 AM    CO2 33 (H) 01/24/2018 05:30 AM    AGAP 4 01/24/2018 05:30 AM    GLU 54 (LL) 01/24/2018 05:30 AM    BUN 35 (H) 01/24/2018 05:30 AM    CREA 1.77 (H) 01/24/2018 05:30 AM    GFRAA 46 (L) 01/24/2018 05:30 AM    GFRNA 38 (L) 01/24/2018 05:30 AM    CA 8.8 01/24/2018 05:30 AM    ALB 2.8 (L) 01/24/2018 05:30 AM    TP 6.3 (L) 01/24/2018 05:30 AM    GLOB 3.5 01/24/2018 05:30 AM    AGRAT 0.8 01/24/2018 05:30 AM    SGOT 11 (L) 01/24/2018 05:30 AM    ALT 14 (L) 01/24/2018 05:30 AM     CBC:   Lab Results Component Value Date/Time    WBC 8.6 01/24/2018 05:30 AM    HGB 10.6 (L) 01/24/2018 05:30 AM    HCT 34.5 (L) 01/24/2018 05:30 AM     01/24/2018 05:30 AM           Assessment/Plan     Principal Problem:    Nephrolithiasis (1/23/2018)    Active Problems:    HTN (hypertension) (1/23/2018)      Type II diabetes mellitus ([facility] (1/23/2018)      Cardiac murmur (1/23/2018)      History of myocardial infarction (1/23/2018)      Alzheimer's dementia (1/23/2018)      Depression (1/23/2018)      Status post cystoscopy (1/23/2018)  Decubitus  ulcer      Plan:pt will remain on rocephin for now until the results of the culture and sensitivities are known

## 2018-01-24 NOTE — PROGRESS NOTES
Patient has designated his daughter to participate in his/her discharge plan and to receive any needed information.      Name:   Nato Denney  daughter   [phone]      Jan 24, 2018        Address:  Phone number:

## 2018-01-24 NOTE — PROGRESS NOTES
2015 Received patient from Christiano Perezows. Patient is alert to person time situation not place. 2033 Skin inspection revealed 0.25 cm open area on sacrum, Skin consult placed, barrier cream in place, Optifoam placed for prevention. 0000 patient resting asleep.  0400 Patient awoke in a panic, this patient is displaying extremely short memory span. 0630 Patient had a blood sugar of 58 when checked on the other hand and a different finger Blood sugar was 57. This nurse began blood sugar protocol 4 glucose tablets and 1 large apple juice were given. Recheck revealed (after 15 minutes as per protocol) 55 Requested MD to be paged, and at this point went for D50 before it could be administered Breakfast was served and patient was eating when entering the room, Immediately rechecked sugar of 122.  0711 Bedside and Verbal shift change report given to Hillary Carrillo RN (oncoming nurse) by Joe Lewis RN (offgoing nurse). Report included the following information SBAR, Kardex, Procedure Summary, Intake/Output, MAR and Recent Results.

## 2018-01-24 NOTE — PROGRESS NOTES
1314 Bedside and Verbal shift change report given to Dulce Wesley RN   (oncoming nurse) by Lucy Sommers (offgoing nurse). Report included the following information SBAR, Kardex and MAR.     1314 assessment complete, patient in bed, call bell within reach, no acute distress noted     1630 MD Erin Gross made aware patient's daughter Manuel Randhawa) request work excuse note for 23rd and 24th and an additional for grandsharon Kuhn, informed will come later today to fill out note. Patient's daughter made aware. 1916 Bedside and Verbal shift change report given to Augustine Garcia (oncoming nurse) by Dulce Wesley RN   (offgoing nurse). Report included the following information SBAR, Kardex and MAR .

## 2018-01-25 LAB
BACTERIA SPEC CULT: ABNORMAL
BACTERIA SPEC CULT: NORMAL
BACTERIA SPEC CULT: NORMAL
GLUCOSE BLD STRIP.AUTO-MCNC: 104 MG/DL (ref 70–110)
GLUCOSE BLD STRIP.AUTO-MCNC: 109 MG/DL (ref 70–110)
GLUCOSE BLD STRIP.AUTO-MCNC: 124 MG/DL (ref 70–110)
GLUCOSE BLD STRIP.AUTO-MCNC: 147 MG/DL (ref 70–110)
GLUCOSE BLD STRIP.AUTO-MCNC: 59 MG/DL (ref 70–110)
SERVICE CMNT-IMP: ABNORMAL
SERVICE CMNT-IMP: NORMAL
SERVICE CMNT-IMP: NORMAL

## 2018-01-25 PROCEDURE — 74011250636 HC RX REV CODE- 250/636: Performed by: NURSE PRACTITIONER

## 2018-01-25 PROCEDURE — 82962 GLUCOSE BLOOD TEST: CPT

## 2018-01-25 PROCEDURE — 74011636637 HC RX REV CODE- 636/637: Performed by: HOSPITALIST

## 2018-01-25 PROCEDURE — 77030011256 HC DRSG MEPILEX <16IN NO BORD MOLN -A

## 2018-01-25 PROCEDURE — 74011000250 HC RX REV CODE- 250: Performed by: UROLOGY

## 2018-01-25 PROCEDURE — 74011250637 HC RX REV CODE- 250/637: Performed by: UROLOGY

## 2018-01-25 PROCEDURE — 74011250636 HC RX REV CODE- 250/636: Performed by: UROLOGY

## 2018-01-25 PROCEDURE — 65270000029 HC RM PRIVATE

## 2018-01-25 PROCEDURE — 74011250637 HC RX REV CODE- 250/637: Performed by: NURSE PRACTITIONER

## 2018-01-25 RX ADMIN — MEMANTINE HYDROCHLORIDE 10 MG: 10 TABLET ORAL at 11:40

## 2018-01-25 RX ADMIN — CEFTRIAXONE 1 G: 1 INJECTION, POWDER, FOR SOLUTION INTRAMUSCULAR; INTRAVENOUS at 16:40

## 2018-01-25 RX ADMIN — OXYBUTYNIN CHLORIDE 5 MG: 5 TABLET ORAL at 21:18

## 2018-01-25 RX ADMIN — OXYBUTYNIN CHLORIDE 5 MG: 5 TABLET ORAL at 11:40

## 2018-01-25 RX ADMIN — HYDROCODONE BITARTRATE AND ACETAMINOPHEN 1 TABLET: 5; 325 TABLET ORAL at 00:51

## 2018-01-25 RX ADMIN — DOCUSATE SODIUM 100 MG: 100 CAPSULE, LIQUID FILLED ORAL at 11:40

## 2018-01-25 RX ADMIN — OXYBUTYNIN CHLORIDE 5 MG: 5 TABLET ORAL at 16:40

## 2018-01-25 RX ADMIN — SERTRALINE HYDROCHLORIDE 50 MG: 50 TABLET ORAL at 11:40

## 2018-01-25 RX ADMIN — DONEPEZIL HYDROCHLORIDE 10 MG: 5 TABLET, FILM COATED ORAL at 21:18

## 2018-01-25 RX ADMIN — INSULIN DETEMIR 12 UNITS: 100 INJECTION, SOLUTION SUBCUTANEOUS at 23:02

## 2018-01-25 RX ADMIN — MEMANTINE HYDROCHLORIDE 10 MG: 10 TABLET ORAL at 19:40

## 2018-01-25 RX ADMIN — ONDANSETRON 4 MG: 2 INJECTION INTRAMUSCULAR; INTRAVENOUS at 08:56

## 2018-01-25 RX ADMIN — DOCUSATE SODIUM 100 MG: 100 CAPSULE, LIQUID FILLED ORAL at 19:40

## 2018-01-25 NOTE — PROGRESS NOTES
1601 assessment complete, patient in bed, call bell within reach, no acute distress noted, held am meds for now, zofran given, will reassess in about hour    1005 patient reported nausea and stomach discomfort resolved. 2301 [facility] Sophia Gray with instructor present performing dressing change    [phone] wound care nurse at bedside, meds given, no acute distress noted     1940 Bedside and Verbal shift change report given to [facility] (oncoming nurse) by Carisa Toribio RN   (offgoing nurse). Report included the following information SBAR, Kardex and MAR.

## 2018-01-25 NOTE — PROGRESS NOTES
Problem: Falls - Risk of  Goal: *Absence of Falls  Document Eusebio Fall Risk and appropriate interventions in the flowsheet.    Outcome: Progressing Towards Goal  Fall Risk Interventions:  Mobility Interventions: Patient to call before getting OOB, Utilize walker, cane, or other assitive device    Mentation Interventions: Door open when patient unattended    Medication Interventions: Patient to call before getting OOB    Elimination Interventions: Patient to call for help with toileting needs, Call light in reach    History of Falls Interventions: Door open when patient unattended

## 2018-01-25 NOTE — DIABETES MGMT
GLYCEMIC CONTROL AND NUTRITION    Assessment/Recommendations:  Blood glucose this am 104 mg/dl  Continue Levemir and corrective insulin as ordered  Will continue inpatient monitoring. Most recent blood glucose values:    Current A1C of 9.7 % is equivalent to average blood glucose of 232 mg/dl over the past 2-3 months.     Current hospital diabetes medications:   Levemir 12 units every bedtime  Lispro corrective insulin coverage AC&HS  Home diabetes medications:  Lantus 50 units every night  Diet:    Diabetic consistent carb  Education:  __x_Refer to Diabetes Education Record             ____Education not indicated at this time      Franny Olson RN

## 2018-01-25 NOTE — DIABETES MGMT
NUTRITIONAL ASSESSMENT GLYCEMIC CONTROL/ PLAN OF CARE     Misha Canas           71 y.o.           1/23/2018                 1. Ureterolithiasis    2. Obstructive uropathy         INTERVENTIONS/PLAN:   Monitor po intake (will observe during meal time), glycemic control, labs and weights. ASSESSMENT:   Nutritional Status:  Unable to assess nutrition status without height or weight. Noted from chart review that pt with nausea today. Nutrition Diagnoses: Altered nutrition related labs due to diabetes as evidenced by A1C of 9.7%. Diabetes Management:   Noted hypoglycemia 1/24 however BG in target range today. Recent blood glucose:   1/25/18:  104, 109  1/24/18:  59/58/57. 122. 148, 163, 233- pt received 18 units insulin (12 units Levemir and 6 units corrective lispro)  Within target range (non-ICU: <140; ICU<180): [] Yes   []  No    Current Insulin regimen:   Levemir 12 units/day  Corrective lispro normal insulin sensitivity ACHS  Home medication/insulin regimen:   Per chart review:  Lantus, 50 units every night   HbA1c: 9.7% - ave BG has been ~ 232 mg/dL over past 3 months. Adequate glycemic control PTA:  [] Yes  [x] No       SUBJECTIVE/OBJECTIVE:   Information obtained from: chart review  Attempted to speak with pt however he was sleeping soundly and did not respond to verbal stimuli x4 therefore unable to obtain diet or weight history. Noted pt with dementia and is transferred to [facility] from [facility] on the [facility]. Pt admitted with nephrolithiasis and is s/p cytoscopy and stent placement 1/23/18. PMHx includes   T2DM, Alzheimers dementia.      Diet: consistent CHO    Patient Vitals for the past 100 hrs:   % Diet Eaten   01/24/18 1810 75 %     Medications: [x]                Reviewed     Most Recent POC Glucose: Recent Labs      01/24/18   0530  01/23/18   1452   GLU  54*  187*         Labs:   Lab Results   Component Value Date/Time    Hemoglobin A1c 9.7 01/23/2018 02:52 PM     Lab Results Component Value Date/Time    Sodium 142 01/24/2018 05:30 AM    Potassium 4.9 01/24/2018 05:30 AM    Chloride 105 01/24/2018 05:30 AM    CO2 33 01/24/2018 05:30 AM    Anion gap 4 01/24/2018 05:30 AM    Glucose 54 01/24/2018 05:30 AM    BUN 35 01/24/2018 05:30 AM    Creatinine 1.77 01/24/2018 05:30 AM    Calcium 8.8 01/24/2018 05:30 AM    Albumin 2.8 01/24/2018 05:30 AM       Anthropometrics:  ,  ,   no height or weight available in chart and pt is not alert enough at this time to report. No family at bedside. Wt Readings from Last 1 Encounters:   No data found for Wt    Ht Readings from Last 1 Encounters:   No data found for Ht       Estimated Nutrition Needs:   ,      Based on:   []          Actual BW    []          ABW   []            Adjusted BW         Nutrition Interventions:  None at this time  Goal:   Blood glucose will be within target range of  mg/dL by 1/28/18. Pt will consume >75% meal by 1/30/18.         Nutrition Monitoring and Evaluation      [x]     Monitor po intake on meal rounds  []     Continue inpatient monitoring and intervention  []     Other:      Nutrition Risk:  []   High     [x]  Moderate    []  Minimal/Uncompromised    Aidan Pacheco RD, CDE   Office:  [address] [place] [address] Pager:  [phone]

## 2018-01-25 NOTE — PROGRESS NOTES
Progress Note      Patient: Jessica Sotelo               Sex: male          DOA: 1/23/2018       YOB: 1945      Age:  71 y.o.        LOS:  LOS: 1 day               Subjective:   Pt is to go back to  The snf .timing is as per urology . Today he is feeling well and has no pain . He has severe short term memory deficits .  Pt;s urine is growing out serratia and is sensitive to rocephin      Objective:      Visit Vitals    /81 (BP 1 Location: Left arm, BP Patient Position: At rest)    Pulse 70    Temp 97.8 °F (36.6 °C)    Resp 18    SpO2 94%       Physical Exam:  Pt is awake and is comfortable   Heart reg rate and rhythm   Lungs good breath sounds heard   Abdomen soft and nontender   Neuro demented         Lab/Data Reviewed:  BMP: No results found for: NA, K, CL, CO2, AGAP, GLU, BUN, CREA, GFRAA, GFRNA  CMP: No results found for: NA, K, CL, CO2, AGAP, GLU, BUN, CREA, GFRAA, GFRNA, CA, MG, PHOS, ALB, TBIL, TP, ALB, GLOB, AGRAT, SGOT, ALT, GPT  CBC: No results found for: WBC, HGB, HGBEXT, HCT, HCTEXT, PLT, PLTEXT, HGBEXT, HCTEXT, PLTEXT        Assessment/Plan     Principal Problem:    Nephrolithiasis (1/23/2018)    Active Problems:    HTN (hypertension) (1/23/2018)      Type II diabetes mellitus ([address] (1/23/2018)      Cardiac murmur (1/23/2018)      History of myocardial infarction (1/23/2018)      Alzheimer's dementia (1/23/2018)      Depression (1/23/2018)      Status post cystoscopy (1/23/2018)        Plan: pt will continue iv rocephin for now

## 2018-01-25 NOTE — ROUTINE PROCESS
Bedside and Verbal shift change report given to 84 Taylor Gray RN (oncoming nurse) by Toya Chao RN (offgoing nurse). Report included the following information SBAR, Kardex, Intake/Output and MAR.

## 2018-01-25 NOTE — PROGRESS NOTES
0122 Patient in the bed resting voiced no respiratory distress/discomfort. Due meds tolerated without complaint, assessed for pain frequently and medicated as needed with good effect. Call bell within reach will continue to monitor.

## 2018-01-25 NOTE — CDMP QUERY
\"Decubitus ulcer\" documented by attending on 1/24 pn. There is no location or stage documented by MD. Please clarify if you concur with wound care nurse. Please clarify if this patient is being treated/managed for:    => Decubitus ulcer stage 2 to left buttock  => Other Explanation of clinical findings  => Unable to Determine (no explanation of clinical findings)    The medical record reflects the following:  Risk:  -- PMH Alzheimer's dementia    Clinical Indicators:  -- wound care progress note:  Decubitus ulcer stage 2 to left buttock    Treatment:   -- wound care progress note:  Cleansed open wound with DWC and dried with 4 x 4 gauze. Covered with zinc ointment, placed blue pad underneath patient, and encouraged patient to change positions in bed frequently. Patient verbalized understanding. Reported findings and wound care to RN. Please clarify and document your clinical opinion in the progress notes and discharge summary including the definitive and/or presumptive diagnosis, (suspected or probable), related to the above clinical findings. Please include clinical findings supporting your diagnosis. If you DECLINE this query or would like to communicate with [facility], please utilize the \"[facility] message box\" at the TOP of the Progress Note on the right.       Thank you,  Chinyere Ceballos [address] [facility], [address] [place] [address]

## 2018-01-25 NOTE — PROGRESS NOTES
The  from [facility] the [facility] returned my call late yesterday and acknowledged patient return to the facility at discharge. Patient was made inpatient yesterday, condition 44 implemented. Discharge plan is to [facility].  Quinten Little RN

## 2018-01-25 NOTE — WOUND CARE
Received IP WOUND CARE CONSULT PER DR. Agapito Oreilly, reviewed EMR, visited patient at bedside. Patient sleeping in bed, no distress noted. Assessed bilateral feet. Healing scan noted on RIGHT GREAT TOE, open callous noted on LEFT GREAT TOE. Cleansed bilateral toes with DWC and dried with 4 x 4 gauze. Applied Xeroform to respective toes and covered. Patient tolerated well. Discussed findings and wound care with [facility]. PLEASE SEE WOUND CARE ORDERS:                     01/25/18 1159   Wound Toe Right   Date First Assessed/Time First Assessed: 01/24/18 1314   POA: Yes  Wound Type: Abrasion  Location: Toe  Wound Description (Optional): GREAT TOE  Orientation: Right   DRESSING TYPE Open to air   Non-Pressure Injury Partial thickness (epider/derm)   Wound Length (cm) 1.8 cm   Wound Width (cm) 1 cm   Wound Depth (cm) 0.1   Wound Surface area (cm^3) 0.18 cm^2   Condition of Base Epithelializing   Condition of Edges Open   Epithelialization (%) 50   Tissue Type Percent Maroon/Purple 100 %   Drainage Amount  None   Wound Odor None   Periwound Skin Condition Intact   Cleansing and Cleansing Agents  Dermal wound cleanser   Dressing Type Applied Xeroform; Foam   Procedure Tolerated Well   Wound Toe Left   Date First Assessed/Time First Assessed: 01/25/18 1100   POA: Yes  Wound Type: Callus/corn  Location: Toe  Wound Description (Optional): GREAT TOE  Orientation: Left   DRESSING TYPE Open to air   Non-Pressure Injury Partial thickness (epider/derm)   Wound Length (cm) 2 cm   Wound Width (cm) 1.5 cm   Wound Depth (cm) 0.1   Wound Surface area (cm^3) 0.3 cm^2   Condition of Base Pink   Condition of Edges Rolled/curled;Calloused   Tissue Type Pink   Tissue Type Percent Pink 100   Drainage Amount  None   Wound Odor None   Periwound Skin Condition Intact;Calloused   Cleansing and Cleansing Agents  Dermal wound cleanser   Dressing Type Applied Xeroform  (Hypafix)   Procedure Tolerated Well

## 2018-01-26 VITALS
SYSTOLIC BLOOD PRESSURE: 148 MMHG | OXYGEN SATURATION: 93 % | RESPIRATION RATE: 18 BRPM | DIASTOLIC BLOOD PRESSURE: 89 MMHG | HEART RATE: 73 BPM | TEMPERATURE: 97.6 F

## 2018-01-26 LAB
ALBUMIN SERPL-MCNC: 2.4 G/DL (ref 3.4–5)
ALBUMIN/GLOB SERPL: 0.7 {RATIO} (ref 0.8–1.7)
ALP SERPL-CCNC: 104 U/L (ref 45–117)
ALT SERPL-CCNC: 14 U/L (ref 16–61)
ANION GAP SERPL CALC-SCNC: 7 MMOL/L (ref 3–18)
AST SERPL-CCNC: 11 U/L (ref 15–37)
BASOPHILS # BLD: 0 K/UL (ref 0–0.06)
BASOPHILS NFR BLD: 0 % (ref 0–2)
BILIRUB SERPL-MCNC: 0.5 MG/DL (ref 0.2–1)
BUN SERPL-MCNC: 37 MG/DL (ref 7–18)
BUN/CREAT SERPL: 29 (ref 12–20)
CALCIUM SERPL-MCNC: 8.3 MG/DL (ref 8.5–10.1)
CHLORIDE SERPL-SCNC: 102 MMOL/L (ref 100–108)
CO2 SERPL-SCNC: 29 MMOL/L (ref 21–32)
CREAT SERPL-MCNC: 1.27 MG/DL (ref 0.6–1.3)
DIFFERENTIAL METHOD BLD: ABNORMAL
EOSINOPHIL # BLD: 0.2 K/UL (ref 0–0.4)
EOSINOPHIL NFR BLD: 4 % (ref 0–5)
ERYTHROCYTE [DISTWIDTH] IN BLOOD BY AUTOMATED COUNT: 16.2 % (ref 11.6–14.5)
GLOBULIN SER CALC-MCNC: 3.4 G/DL (ref 2–4)
GLUCOSE BLD STRIP.AUTO-MCNC: 111 MG/DL (ref 70–110)
GLUCOSE BLD STRIP.AUTO-MCNC: 131 MG/DL (ref 70–110)
GLUCOSE SERPL-MCNC: 137 MG/DL (ref 74–99)
HCT VFR BLD AUTO: 32.1 % (ref 36–48)
HGB BLD-MCNC: 10.1 G/DL (ref 13–16)
LYMPHOCYTES # BLD: 1 K/UL (ref 0.9–3.6)
LYMPHOCYTES NFR BLD: 15 % (ref 21–52)
MCH RBC QN AUTO: 27.7 PG (ref 24–34)
MCHC RBC AUTO-ENTMCNC: 31.5 G/DL (ref 31–37)
MCV RBC AUTO: 88.2 FL (ref 74–97)
MONOCYTES # BLD: 0.6 K/UL (ref 0.05–1.2)
MONOCYTES NFR BLD: 9 % (ref 3–10)
NEUTS SEG # BLD: 5.1 K/UL (ref 1.8–8)
NEUTS SEG NFR BLD: 72 % (ref 40–73)
PLATELET # BLD AUTO: 178 K/UL (ref 135–420)
PMV BLD AUTO: 10.5 FL (ref 9.2–11.8)
POTASSIUM SERPL-SCNC: 5.2 MMOL/L (ref 3.5–5.5)
PROT SERPL-MCNC: 5.8 G/DL (ref 6.4–8.2)
RBC # BLD AUTO: 3.64 M/UL (ref 4.7–5.5)
SODIUM SERPL-SCNC: 138 MMOL/L (ref 136–145)
WBC # BLD AUTO: 7 K/UL (ref 4.6–13.2)

## 2018-01-26 PROCEDURE — 36415 COLL VENOUS BLD VENIPUNCTURE: CPT | Performed by: HOSPITALIST

## 2018-01-26 PROCEDURE — 74011250637 HC RX REV CODE- 250/637: Performed by: NURSE PRACTITIONER

## 2018-01-26 PROCEDURE — 74011250637 HC RX REV CODE- 250/637: Performed by: UROLOGY

## 2018-01-26 PROCEDURE — 74011250637 HC RX REV CODE- 250/637: Performed by: HOSPITALIST

## 2018-01-26 PROCEDURE — 74011250636 HC RX REV CODE- 250/636: Performed by: NURSE PRACTITIONER

## 2018-01-26 PROCEDURE — 82962 GLUCOSE BLOOD TEST: CPT

## 2018-01-26 PROCEDURE — 85025 COMPLETE CBC W/AUTO DIFF WBC: CPT | Performed by: HOSPITALIST

## 2018-01-26 PROCEDURE — 80053 COMPREHEN METABOLIC PANEL: CPT | Performed by: HOSPITALIST

## 2018-01-26 RX ORDER — OXYBUTYNIN CHLORIDE 5 MG/1
5 TABLET ORAL 4 TIMES DAILY
Qty: 30 TAB | Refills: 3 | Status: ON HOLD | OUTPATIENT
Start: 2018-01-26 | End: 2018-03-27 | Stop reason: ALTCHOICE

## 2018-01-26 RX ORDER — BISACODYL 5 MG
10 TABLET, DELAYED RELEASE (ENTERIC COATED) ORAL
Status: COMPLETED | OUTPATIENT
Start: 2018-01-26 | End: 2018-01-26

## 2018-01-26 RX ADMIN — SODIUM CHLORIDE 25 ML/HR: 900 INJECTION, SOLUTION INTRAVENOUS at 03:27

## 2018-01-26 RX ADMIN — MEMANTINE HYDROCHLORIDE 10 MG: 10 TABLET ORAL at 09:20

## 2018-01-26 RX ADMIN — ONDANSETRON 4 MG: 2 INJECTION INTRAMUSCULAR; INTRAVENOUS at 14:24

## 2018-01-26 RX ADMIN — BISACODYL 10 MG: 5 TABLET, COATED ORAL at 14:24

## 2018-01-26 RX ADMIN — SERTRALINE HYDROCHLORIDE 50 MG: 50 TABLET ORAL at 09:20

## 2018-01-26 RX ADMIN — OXYBUTYNIN CHLORIDE 5 MG: 5 TABLET ORAL at 11:20

## 2018-01-26 RX ADMIN — OXYBUTYNIN CHLORIDE 5 MG: 5 TABLET ORAL at 03:41

## 2018-01-26 RX ADMIN — HYDROCODONE BITARTRATE AND ACETAMINOPHEN 1 TABLET: 5; 325 TABLET ORAL at 03:41

## 2018-01-26 RX ADMIN — DOCUSATE SODIUM 100 MG: 100 CAPSULE, LIQUID FILLED ORAL at 09:20

## 2018-01-26 RX ADMIN — ONDANSETRON 4 MG: 2 INJECTION INTRAMUSCULAR; INTRAVENOUS at 09:47

## 2018-01-26 NOTE — PROGRESS NOTES
2122: Received patient in bed awake,alert and oriented x 2-3. No signs  Of distress. Bed low and locked. Call bell within reach. Will continue monitoring. 0600: In bed ,uneventful night,call bell within reach. Will monitor.

## 2018-01-26 NOTE — PROGRESS NOTES
Transportation at Discharge:  1/26/18    Transport Company:  [address] ([facility])  Reference number:  None    Estimated pick-up time:  2:30p    Method of Transport: [address]:  Medicare/BC  Authorization: No auth req    Made D/C transportation arrangements at the request of Outcomes Manager Dulce Bonilla, Care- ext [phone]

## 2018-01-26 NOTE — DISCHARGE INSTRUCTIONS
DISCHARGE SUMMARY from Nurse    PATIENT INSTRUCTIONS:    After general anesthesia or intravenous sedation, for 24 hours or while taking prescription Narcotics:  · Limit your activities  · Do not drive and operate hazardous machinery  · Do not make important personal or business decisions  · Do  not drink alcoholic beverages  · If you have not urinated within 8 hours after discharge, please contact your surgeon on call. Report the following to your surgeon:  · Excessive pain, swelling, redness or odor of or around the surgical area  · Temperature over 100.5  · Nausea and vomiting lasting longer than 4 hours or if unable to take medications  · Any signs of decreased circulation or nerve impairment to extremity: change in color, persistent  numbness, tingling, coldness or increase pain  · Any questions    What to do at Home:  Recommended activity: Activity as tolerated. If you experience any of the following symptoms severe pain, nausea and vomiting, fever above 100.5, shortness of breath, please follow up with your primary care physician. *  Please give a list of your current medications to your Primary Care Provider. *  Please update this list whenever your medications are discontinued, doses are      changed, or new medications (including over-the-counter products) are added. *  Please carry medication information at all times in case of emergency situations. These are general instructions for a healthy lifestyle:    No smoking/ No tobacco products/ Avoid exposure to second hand smoke  Surgeon General's Warning:  Quitting smoking now greatly reduces serious risk to your health.     Obesity, smoking, and sedentary lifestyle greatly increases your risk for illness    A healthy diet, regular physical exercise & weight monitoring are important for maintaining a healthy lifestyle    You may be retaining fluid if you have a history of heart failure or if you experience any of the following symptoms: Weight gain of 3 pounds or more overnight or 5 pounds in a week, increased swelling in our hands or feet or shortness of breath while lying flat in bed. Please call your doctor as soon as you notice any of these symptoms; do not wait until your next office visit. Recognize signs and symptoms of STROKE:    F-face looks uneven    A-arms unable to move or move unevenly    S-speech slurred or non-existent    T-time-call 911 as soon as signs and symptoms begin-DO NOT go       Back to bed or wait to see if you get better-TIME IS BRAIN. Warning Signs of HEART ATTACK     Call 911 if you have these symptoms:   Chest discomfort. Most heart attacks involve discomfort in the center of the chest that lasts more than a few minutes, or that goes away and comes back. It can feel like uncomfortable pressure, squeezing, fullness, or pain.  Discomfort in other areas of the upper body. Symptoms can include pain or discomfort in one or both arms, the back, neck, jaw, or stomach.  Shortness of breath with or without chest discomfort.  Other signs may include breaking out in a cold sweat, nausea, or lightheadedness. Don't wait more than five minutes to call 911 - MINUTES MATTER! Fast action can save your life. Calling 911 is almost always the fastest way to get lifesaving treatment. Emergency Medical Services staff can begin treatment when they arrive -- up to an hour sooner than if someone gets to the hospital by car. The discharge information has been reviewed with the patient. The patient verbalized understanding. Discharge medications reviewed with the patient and appropriate educational materials and side effects teaching were provided. Patient armband removed and shredded.   ___________________________________________________________________________________________________________________________________

## 2018-01-26 NOTE — PROGRESS NOTES
0725 Pt received resting on bed. Pt mentioned of discomfort on the abdomen. Will monitor this. Pt with periodic confusion. No nausea no vomiting. No shortness of breath. Pt mentioned of desire to stay in the hospital until tomorrow, MD informed. 0940 Pt forgetting what he wants to say, still aware of name, date and where he is. Denies any pain at this time,but complaining of nausea. Pt instructed to get out of bed, pt refused. As per pt, he feels sick in the stomach. Zofran given    1500 [facility]. IV discontinued, no swelling, no redness. Denies any pain. Offered pain medication, as per pt he is not in pain. Reminded pt that pain must be controlled, pt still refused pain medication. No shortness of breath, no difficulty swallowing, speech is clear.

## 2018-01-26 NOTE — DISCHARGE SUMMARY
Discharge Summary    Patient: Brittani Juarez               Sex: male          DOA: 1/23/2018         YOB: 1945      Age:  71 y.o.        LOS:  LOS: 2 days                Admit Date: 1/23/2018    Discharge Date: 1/26/2018    Admission Diagnoses: kidney stone  Status post cystoscopy  Status post cystoscopy    Discharge Diagnoses:    Problem List as of 1/26/2018  Date Reviewed: 1/23/2018          Codes Class Noted - Resolved    * (Principal)Nephrolithiasis ICD-10-CM: N20.0  ICD-9-CM: 592.0  1/23/2018 - Present        HTN (hypertension) ICD-10-CM: I10  ICD-9-CM: 401.9  1/23/2018 - Present        Type II diabetes mellitus ([address] ICD-10-CM: E11.9  ICD-9-CM: 250.00  1/23/2018 - Present        Cardiac murmur ICD-10-CM: R01.1  ICD-9-CM: 785.2  1/23/2018 - Present        History of myocardial infarction ICD-10-CM: I25.2  ICD-9-CM: 412  1/23/2018 - Present        Alzheimer's dementia ICD-10-CM: G30.9  ICD-9-CM: 331.0  1/23/2018 - Present        Depression ICD-10-CM: F32.9  ICD-9-CM: 327  1/23/2018 - Present        Status post cystoscopy ICD-10-CM: Z98.890  ICD-9-CM: V45.89  1/23/2018 - Present              Discharge Condition:  Improved    Hospital Course:pt is a 71year old male who has a h/o dementia aqnd lives a facility in the [address] [place], [place] [address]he has a h/o diabetes mellitus type 2 and htn  He had an onset of abdominal pain and a ct scan  Showed the presence of a kidney stone in the right. the pt had a 7 mm stone with mild hydronephrosis he was then transferred to the hospital at [facility] . There he was seen by dr Yolanda Brady and the pt  underwent a cystoscopy  ,uretral/meatal  dilation . right retrograde pyelogram ,right double J stent   The pt did well post op and his pain subsided after 24 hours . He was on a sliding scale for his diabetes . He will be sent back to the snf and he will see dr Yolanda Brady in the office in a week and the kidney stone will be removed .        Consults:    Treatment Team: Attending Provider: Curtis Cooley Bijan Olvera MD; Consulting Provider: Luci Kelly MD; Care Manager: Ward Gomez; Utilization Review: Carri Romo    Significant Diagnostic Studies:     Discharge Medications:     Current Discharge Medication List      START taking these medications    Details   oxybutynin (DITROPAN) 5 mg tablet Take 1 Tab by mouth four (4) times daily. Indications: post op  Qty: 30 Tab, Refills: 3         CONTINUE these medications which have NOT CHANGED    Details   donepezil (ARICEPT) 10 mg tablet Take 10 mg by mouth nightly. furosemide (LASIX) 40 mg tablet Take  by mouth daily. memantine (NAMENDA) 10 mg tablet Take  by mouth two (2) times a day. sertraline (ZOLOFT) 50 mg tablet Take  by mouth daily. insulin glargine (LANTUS) 100 unit/mL injection 50 Units by SubCUTAneous route nightly.              Activity as tolerated    Diet: diabetic     Wound Care: none    Follow-up: with urology

## 2018-01-26 NOTE — PROGRESS NOTES
Patient to discharge today back to [facility] in [facility] on the [facility], verified acceptance with the nursing facility. I called and spoke to the patients daughter, she agrees with discharge plan. [facility] to transport at 230 pm today, this was the first available time for travel to the [facility]. PCS form and check list to the nurse station. I spoke to Dr Luis Holden he is to to complete dc summary.  Quinten Little RN

## 2018-01-26 NOTE — PROGRESS NOTES
I have reviewed discharge instructions with the patient. The patient verbalized understanding however states 'they are discharging me too early\".

## 2018-01-26 NOTE — ROUTINE PROCESS
Bedside and Verbal shift change report given to Olamide Linn (oncoming nurse) by Jeny Cheung (offgoing nurse). Report included the following information SBAR, Kardex, MAR and Recent Results.

## 2018-01-26 NOTE — PROGRESS NOTES
Problem: Falls - Risk of  Goal: *Absence of Falls  Document Eusebio Fall Risk and appropriate interventions in the flowsheet.    Outcome: Progressing Towards Goal  Fall Risk Interventions:  Mobility Interventions: Patient to call before getting OOB, Utilize walker, cane, or other assitive device    Mentation Interventions: Door open when patient unattended, More frequent rounding, Reorient patient    Medication Interventions: Patient to call before getting OOB, Teach patient to arise slowly, Utilize gait belt for transfers/ambulation    Elimination Interventions: Call light in reach, Patient to call for help with toileting needs    History of Falls Interventions: Door open when patient unattended

## 2018-03-27 PROBLEM — N39.0 UTI (URINARY TRACT INFECTION): Status: ACTIVE | Noted: 2018-03-27

## 2018-03-27 PROBLEM — N20.1 RIGHT URETERAL STONE: Status: ACTIVE | Noted: 2018-03-27

## (undated) DEVICE — KENDALL SCD EXPRESS SLEEVES, KNEE LENGTH, MEDIUM: Brand: KENDALL SCD

## (undated) DEVICE — STERILE LATEX POWDER-FREE SURGICAL GLOVESWITH NITRILE COATING: Brand: PROTEXIS

## (undated) DEVICE — OPEN-END FLEXI-TIP URETERAL CATHETER: Brand: FLEXI-TIP

## (undated) DEVICE — TRAY PREP DRY W/ PREM GLV 2 APPL 6 SPNG 2 UNDPD 1 OVERWRAP

## (undated) DEVICE — Device

## (undated) DEVICE — CONTAINER DRN 20L DISP FLUIDRN LLS

## (undated) DEVICE — SOLUTION IRRIG 3000ML 0.9% SOD CHL FLX CONT 0797208] ICU MEDICAL INC]

## (undated) DEVICE — GDWIREGLDEWIRE 0.038INX150CM --

## (undated) DEVICE — BAG DRNGE NONSTERILE W/ SUCT HOSE CYSTO/UROLOGICAL FOR GE

## (undated) DEVICE — CATHETER URETH 16FR BLLN 5CC SIL ALLY W/ SIL HYDRGEL 2 W F

## (undated) DEVICE — SOLUTION IV 1000ML 0.9% SOD CHL

## (undated) DEVICE — SPONGE GZ W4XL4IN COT 12 PLY TYP VII WVN C FLD DSGN